# Patient Record
Sex: FEMALE | Race: WHITE | Employment: OTHER | URBAN - METROPOLITAN AREA
[De-identification: names, ages, dates, MRNs, and addresses within clinical notes are randomized per-mention and may not be internally consistent; named-entity substitution may affect disease eponyms.]

---

## 2017-08-22 ENCOUNTER — HOSPITAL ENCOUNTER (OUTPATIENT)
Dept: MAMMOGRAPHY | Age: 67
Discharge: HOME | End: 2017-08-22
Admitting: FAMILY MEDICINE

## 2017-08-22 DIAGNOSIS — Z12.31 VISIT FOR SCREENING MAMMOGRAM: ICD-10-CM

## 2017-09-07 ENCOUNTER — OFFICE VISIT (OUTPATIENT)
Dept: ORTHOPEDIC SURGERY | Age: 67
End: 2017-09-07

## 2017-09-07 VITALS
HEIGHT: 64 IN | DIASTOLIC BLOOD PRESSURE: 80 MMHG | BODY MASS INDEX: 42 KG/M2 | HEART RATE: 74 BPM | WEIGHT: 246 LBS | SYSTOLIC BLOOD PRESSURE: 136 MMHG

## 2017-09-07 DIAGNOSIS — M72.2 PLANTAR FASCIITIS, LEFT: ICD-10-CM

## 2017-09-07 DIAGNOSIS — M65.9 TENOSYNOVITIS OF FOOT: Primary | ICD-10-CM

## 2017-09-07 PROCEDURE — 99203 OFFICE O/P NEW LOW 30 MIN: CPT | Performed by: PODIATRIST

## 2017-09-07 RX ORDER — DILTIAZEM HYDROCHLORIDE 180 MG/1
CAPSULE, COATED, EXTENDED RELEASE ORAL
COMMUNITY
Start: 2017-01-10 | End: 2021-02-17

## 2017-09-07 RX ORDER — LOSARTAN POTASSIUM 100 MG/1
TABLET ORAL
COMMUNITY
Start: 2017-08-16

## 2017-09-07 RX ORDER — PREDNISONE 10 MG/1
TABLET ORAL
Qty: 26 TABLET | Refills: 0 | Status: SHIPPED | OUTPATIENT
Start: 2017-09-07 | End: 2021-02-17

## 2017-10-05 ENCOUNTER — OFFICE VISIT (OUTPATIENT)
Dept: ORTHOPEDIC SURGERY | Age: 67
End: 2017-10-05

## 2017-10-05 VITALS
WEIGHT: 246.03 LBS | BODY MASS INDEX: 42 KG/M2 | HEIGHT: 64 IN | HEART RATE: 74 BPM | SYSTOLIC BLOOD PRESSURE: 135 MMHG | DIASTOLIC BLOOD PRESSURE: 75 MMHG

## 2017-10-05 DIAGNOSIS — M72.2 PLANTAR FASCIITIS, LEFT: Primary | ICD-10-CM

## 2017-10-05 PROCEDURE — 99213 OFFICE O/P EST LOW 20 MIN: CPT | Performed by: PODIATRIST

## 2017-10-05 RX ORDER — SPIRONOLACTONE 50 MG/1
TABLET, FILM COATED ORAL
COMMUNITY
Start: 2017-09-18

## 2017-10-05 RX ORDER — GLIPIZIDE 10 MG/1
TABLET, FILM COATED, EXTENDED RELEASE ORAL
COMMUNITY
Start: 2017-08-14

## 2017-10-05 RX ORDER — LOSARTAN POTASSIUM 100 MG/1
TABLET ORAL
COMMUNITY
Start: 2017-08-16 | End: 2021-02-17

## 2017-10-05 RX ORDER — IBUPROFEN 800 MG/1
TABLET ORAL
COMMUNITY
Start: 2017-05-31 | End: 2021-05-12

## 2017-10-05 RX ORDER — DEXAMETHASONE SODIUM PHOSPHATE 4 MG/ML
INJECTION, SOLUTION INTRA-ARTICULAR; INTRALESIONAL; INTRAMUSCULAR; INTRAVENOUS; SOFT TISSUE
Qty: 30 ML | Refills: 0 | Status: SHIPPED | OUTPATIENT
Start: 2017-10-05 | End: 2021-02-17

## 2017-10-05 RX ORDER — DILTIAZEM HYDROCHLORIDE 180 MG/1
CAPSULE, COATED, EXTENDED RELEASE ORAL
COMMUNITY
Start: 2017-01-10 | End: 2021-02-17

## 2017-10-05 RX ORDER — FLUTICASONE PROPIONATE 50 MCG
1 SPRAY, SUSPENSION (ML) NASAL
COMMUNITY
Start: 2017-09-25

## 2017-10-05 NOTE — PROGRESS NOTES
HISTORY OF PRESENT ILLNESS: This is return visit for left plantar heel pain. There has been less than 50% improvement. She admits that her activity level has not changed all too much. PHYSICAL EXAMINATION: The majority of the palpable tenderness is at the plantar medial aspect of left heel. There is no pain with side-to-side compression of the heel, bilateral.     There are no paresthesias elicited at the tibial nerve, over the tarsal tunnel, bilateral. Sensation is otherwise grossly intact, bilateral.     There is no erythema, ecchymosis, or edema noted, bilateral.     Palpable pedal pulses are present, bilateral.     The remainder of the exam is unremarkable. ASSESSMENT: Plantar fasciitis , left       PLAN: I believe her activity level remains too high still. We discussed several activity modifications. She will continue with the boot. I prescribed physical therapy. A new temporary arch support was applied to her left foot. I will see her back in 4 weeks.

## 2017-10-05 NOTE — MR AVS SNAPSHOT
After Visit Summary             Kateryna Smith [address] [facility]   10/5/2017 9:10 AM   Office Visit    Description:  Female : 1950   Provider:  Rashid Alegre DPM   Department:  [facility]              Your Follow-Up and Future Appointments         Below is a list of your follow-up and future appointments. This may not be a complete list as you may have made appointments directly with providers that we are not aware of or your providers may have made some for you. Please call your providers to confirm appointments. It is important to keep your appointments. Please bring your current insurance card, photo ID, co-pay, and all medication bottles to your appointment. If self-pay, payment is expected at the time of service. Information from Your Visit        Department     Name Address Phone Fax    [facility] [address]  [address] [address] [place] [address] [facility] [phone]      You Were Seen for:         Comments    Plantar fasciitis, left   [[id number]]         Vital Signs     Blood Pressure Pulse Height Weight Body Mass Index Smoking Status    135/75 74 5' 4.17\" (1.63 m) 246 lb 0.5 oz (111.6 kg) 42 kg/m2 Never Smoker      Additional Information about your Body Mass Index (BMI)           Your BMI as listed above is considered obese (30 or more). BMI is an estimate of body fat, calculated from your height and weight. The higher your BMI, the greater your risk of heart disease, high blood pressure, type 2 diabetes, stroke, gallstones, arthritis, sleep apnea, and certain cancers. BMI is not perfect. It may overestimate body fat in athletes and people who are more muscular. Even a small weight loss (between 5 and 10 percent of your current weight) by decreasing your calorie intake and becoming more physically active will help lower your risk of developing or worsening diseases associated with obesity. Learn more at: [email or web address].[email or web address].uk             Today's Medication Changes          These changes are accurate as of: 10/5/17  9:55 AM.  If you have any questions, ask your nurse or doctor.                START taking these medications           dexamethasone 4 MG/ML injection   Commonly known as:  DECADRON   Instructions:  Apply via iontophoresis with physical therapy   Quantity:  30 mL   Refills:  0   Started by:  Riley Sanchez DPM            Where to Get Your Medications      These medications were sent to [address] [place] [address] Son, [address] [address] - P [phone] - F [phone]  [address], [address] [id number]     Phone:  [phone]     dexamethasone 4 MG/ML injection               Your Current Medications Are              fluticasone (FLONASE) 50 MCG/ACT nasal spray 1 spray by Nasal route    fluticasone-salmeterol (ADVAIR DISKUS) 500-50 MCG/DOSE diskus inhaler USE 1 INHALATION TWICE A DAY    spironolactone (ALDACTONE) 50 MG tablet TAKE 1 TABLET TWICE A DAY    diltiazem (CARTIA XT) 180 MG extended release capsule TAKE 1 CAPSULE DAILY    estrogens, conjugated, (PREMARIN) 0.3 MG tablet TAKE 2 TABLETS DAILY FOR 21 DAYS THEN DO NOT TAKE FOR 7 DAYS    glipiZIDE (GLUCOTROL XL) 10 MG extended release tablet TAKE 1 TABLET DAILY    metFORMIN (GLUCOPHAGE) 1000 MG tablet TAKE 1 TABLET TWICE A DAY WITH MEALS    losartan (COZAAR) 100 MG tablet TAKE 1 TABLET DAILY    ibuprofen (ADVIL;MOTRIN) 800 MG tablet TAKE 1 TABLET BY MOUTH EVERY EIGHT HOURS AS NEEDED    dexamethasone (DECADRON) 4 MG/ML injection Apply via iontophoresis with physical therapy    aclidinium (TUDORZA PRESSAIR) 400 MCG/ACT AEPB inhaler Inhale 1 puff into the lungs    fluticasone-salmeterol (ADVAIR DISKUS) 500-50 MCG/DOSE diskus inhaler USE 1 INHALATION TWICE A DAY    diltiazem (CARTIA XT) 180 MG extended release capsule TAKE 1 CAPSULE DAILY    losartan (COZAAR) 100 MG tablet TAKE 1 TABLET DAILY predniSONE (DELTASONE) 10 MG tablet 3 po bid x 2 days, then 2 po bid x 2 days, then 1 po bid x 2 days, then 1 po qd x 2 days      Allergies           No Known Allergies      We Ordered/Performed the following           Amb External Referral To Physical Therapy     Scheduling Instructions:    Physical therapy referral for plantar fasciitis. Please evaluate and treat. Modalities of choice to include ultrasound and iontophoresis with dexamethasone phosphate. Active and passive range of motion exercises, massage therapy. 2 times a week for 4 weeks. Comments: The patient can be scheduled with any member of the group, including the provider with the first available appointments. Additional Information        Basic Information     Date Of Birth Sex Race Ethnicity Preferred Language    1950 Female White Non-/Non  English      Problem List as of 10/5/2017  Date Reviewed: 10/5/2017                Plantar fasciitis, left    Tenosynovitis of foot      Preventive Care        Date Due    Hepatitis C screening is recommended for all adults regardless of risk factors born between [facility] at least once (lifetime) who have never been tested. 1950    Tetanus Combination Vaccine (1 - Tdap) 2/14/1968    Cholesterol Screening 2/14/1989    Diabetes Screening 2/14/1989    Colonoscopy 2/14/1999    Zoster Vaccine 2/14/2009    Pneumococcal Vaccines (two) for all adults aged 72 and over (1 of 2 - PCV13) 2/14/2014    Yearly Flu Vaccine (1) 9/1/2017    Mammograms are recommended every 2 years for low/average risk patients aged 48 - 69, and every year for high risk patients per updated national guidelines. However these guidelines can be individualized by your provider. 8/22/2019            WePopp Signup           WePopp allows you to send messages to your doctor, view your test results, renew your prescriptions, schedule appointments, view visit notes, and more. How Do I Sign Up? 1. In your Internet browser, go to [email or web address]  2. Click on the Sign Up Now link in the Sign In box. You will see the New Member Sign Up page. 3. Enter your [employer] Access Code exactly as it appears below. You will not need to use this code after youve completed the sign-up process. If you do not sign up before the expiration date, you must request a new code. 72[employer] Access Code: [email or web address]  Expires: 11/6/2017  4:10 PM    4. Enter your Social Security Number ([id number]) and Date of Birth (mm/dd/yyyy) as indicated and click Submit. You will be taken to the next sign-up page. 5. Create a iHydroRunt ID. This will be your [employer]uan login ID and cannot be changed, so think of one that is secure and easy to remember. 6. Create a 72xuan password. You can change your password at any time. 7. Enter your Password Reset Question and Answer. This can be used at a later time if you forget your password. 8. Enter your e-mail address. You will receive e-mail notification when new information is available in [address] 9. Click Sign Up. You can now view your medical record. Additional Information  If you have questions, please contact the physician practice where you receive care. Remember, 72[employer]uan is NOT to be used for urgent needs. For medical emergencies, dial 911. For questions regarding your [employer] account call [phone]. If you have a clinical question, please call your doctor's office.

## 2017-10-16 ENCOUNTER — HOSPITAL ENCOUNTER (OUTPATIENT)
Dept: PHYSICAL THERAPY | Age: 67
Discharge: HOME | End: 2017-10-31
Admitting: PODIATRIST

## 2017-10-16 NOTE — PLAN OF CARE
[facility]  Orthopaedics and Sports Rehabilitation, [facility]  [address], [place], [address]  Phone: [phone]   Fax:     [phone]                                                       Physical Therapy Certification    Dear Referring Practitioner: Dr. Rodolfo Coello,    We had the pleasure of evaluating the following patient for physical therapy services at [address] [place] [address]. A summary of our findings can be found in the initial assessment below. This includes our plan of care. If you have any questions or concerns regarding these findings, please do not hesitate to contact me at the office phone number checked above. Thank you for the referral.       Physician Signature:_______________________________Date:__________________  By signing above (or electronic signature), therapists plan is approved by physician      Patient: Sushil Suarez   : 1950   MRN: [id number]  Referring Physician: Referring Practitioner: Dr. Rodolfo Coello      Evaluation Date: 10/16/2017      Medical Diagnosis Information:  Diagnosis: Left plantar fascitis  M72.2   Treatment Diagnosis: PT practice pattern:  4D,    left foot pain                                         Insurance information: PT Insurance Information: [employer]   visits based on medical necessity   $0 copay     Precautions/ Contra-indications: none  Latex Allergy:  [x]NO      []YES  Preferred Language for Healthcare:   [x]English       []other:    SUBJECTIVE: Patient stated complaint: Left foot pain that is ongoing for the past 6 months. Twisted foot when walking down the stairs. Had MRI in May and was told she had 2 torn tendons in the foot. Has worn walking boot off/on since the injury. Has also had injection in the foot and taken a steroid dose pack with minimal relief. Currently wearing walking boot when up on feet.   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):   []None           Arthritic conditions   []Rheumatoid arthritis (M05.9)  [x]Osteoarthritis (M19.91)   Cardiovascular conditions   [x]Hypertension (I10)  []Hyperlipidemia (E78.5)  []Angina pectoris (I20)  []Atherosclerosis (I70)   Musculoskeletal conditions   []Disc pathology   []Congenital spine pathologies   []Prior surgical intervention  []Osteoporosis (M81.8)  []Osteopenia (M85.8)   Endocrine conditions   []Hypothyroid (E03.9)  []Hyperthyroid Gastrointestinal conditions   []Constipation (Q23.64)   Metabolic conditions   []Morbid obesity (E66.01)  []Diabetes type 1(E10.65) or 2 (E11.65)   []Neuropathy (G60.9)     Pulmonary conditions   [x]Asthma (J45)  []Coughing   []COPD (J44.9)   Psychological Disorders  []Anxiety (F41.9)  []Depression (F32.9)   []Other:   []Other:          Barriers to/and or personal factors that will affect rehab potential:              []Age  []Sex              []Motivation/Lack of Motivation                        []Co-Morbidities              []Cognitive Function, education/learning barriers              []Environmental, home barriers              []profession/work barriers  []past PT/medical experience  []other:  Justification:     PACEMAKER:  - Denied having a pacemaker that would contraindicate the use of electrical modalities. METAL IMPLANTS:  - Denied metal implants that would contraindicate the use of thermal modalities. CANCER HISTORY:  - Denied a history of cancer that would contraindicate thermal modalities. Falls Risk Assessment (30 days):   [x] Falls Risk assessed and no intervention required. [] Falls Risk assessed and Patient requires intervention due to being higher risk   TUG score (>12s at risk):     [] Falls education provided, including       G-Codes:  PT G-Codes  Functional Assessment Tool Used: LEFS  Score: 46%  Functional Limitation: Mobility: Walking and moving around  Mobility: Walking and Moving Around Current Status ():  At weakness/NMR control      [x]Signs/symptoms consistent with tendinitis/tendinosis    []signs/symptoms consistent with pathology which may benefit from Dry needling      []other:      Prognosis/Rehab Potential:      []Excellent   [x]Good    []Fair   []Poor    Tolerance of evaluation/treatment:    []Excellent   [x]Good    []Fair   []Poor    Physical Therapy Evaluation Complexity Justification  [x] A history of present problem with:  [] no personal factors and/or comorbidities that impact the plan of care;  []1-2 personal factors and/or comorbidities that impact the plan of care  [x]3 personal factors and/or comorbidities that impact the plan of care  [x] An examination of body systems using standardized tests and measures addressing any of the following: body structures and functions (impairments), activity limitations, and/or participation restrictions;:  [] a total of 1-2 or more elements   [] a total of 3 or more elements   [x] a total of 4 or more elements   [x] A clinical presentation with:  [x] stable and/or uncomplicated characteristics   [] evolving clinical presentation with changing characteristics  [] unstable and unpredictable characteristics;   [x] Clinical decision making of [x] low, [] moderate, [] high complexity using standardized patient assessment instrument and/or measurable assessment of functional outcome. [x] EVAL (LOW) 66875 (typically 20 minutes face-to-face)  [] EVAL (MOD) 07584 (typically 30 minutes face-to-face)  [] EVAL (HIGH) 71656 (typically 45 minutes face-to-face)  [] RE-EVAL     PLAN  Frequency/Duration:  2 days per week for 4 Weeks:  Interventions:  1. Therapeutic exercise including: strength training, ROM/flexibility, NMR and proprioception for the proximal hip, core and Lower extremity  2. Manual therapy as indicated including Dry Needling/IASTM, STM, PROM, Gr I-IV mobilizations, spinal mobilization/manipulation.    3. Modalities as needed including: thermal agents, E-stim, US,

## 2017-10-16 NOTE — FLOWSHEET NOTE
[facility]  Orthopaedics and Sports Rehabilitation, [facility]    Physical Therapy Daily Treatment Note  Date:  10/16/2017    Patient Name:  Lynita Rinne    :  1950  MRN: [id number]  Restrictions/Precautions:    Medical/Treatment Diagnosis Information:  · Diagnosis: Left plantar fascitis  M72.2  · Treatment Diagnosis: PT practice pattern:  4D,    left foot pain  Insurance/Certification information:  PT Insurance Information: [facility] Medicare   visits based on medical necessity   $0 copay  Physician Information:  Referring Practitioner: Dr. Rebecca Aguilar of care signed (Y/N):     Date of Patient follow up with Physician:     G-Code (if applicable):   CK   Date G-Code Applied:  10/16/17  PT G-Codes  Functional Assessment Tool Used: LEFS  Score: 46%  Functional Limitation: Mobility: Walking and moving around  Mobility: Walking and Moving Around Current Status (): At least 40 percent but less than 60 percent impaired, limited or restricted  Mobility: Walking and Moving Around Goal Status (): At least 1 percent but less than 20 percent impaired, limited or restricted    Progress Note: [x]  Yes  []  No  Next due by: Visit #10       Latex Allergy:  [x]NO      []YES  Preferred Language for Healthcare:   [x]English       []other:    Visit # Insurance Allowable   1 BMN     Pain level:  5/10     SUBJECTIVE:  See eval    OBJECTIVE: See eval  Observation:   Test measurements:      RESTRICTIONS/PRECAUTIONS:  OA, HBP, asthma    Exercises/Interventions:     Exercise Sets/Reps Notes Last Progression   Gastroc,/Soleus Stretch 3x30'' each belt    Heelslides       LLLD Wallslide      HS Stretch:  Tableside  3x30''     SAQ      LAQ      SLR Flex      SLR Abd       SLR Ext      SLR Add.       Clamshells      Bridges      HR/TR      Ankle Theraband      BAPS      Bike      Elliptical      Standing Stretch:  (insert muscles)      Weight Shifting      Lateral Walk      Squats      Single Leg Stance Balance Seated arch stretch 3x30''     Towel crunch 3'                                 Therapeutic Exercise and NMR EXR  [] (07942) Provided verbal/tactile cueing for activities related to strengthening, flexibility, endurance, ROM for improvements in LE, proximal hip, and core control with self care, mobility, lifting, ambulation.  [] (78501) Provided verbal/tactile cueing for activities related to improving balance, coordination, kinesthetic sense, posture, motor skill, proprioception  to assist with LE, proximal hip, and core control in self care, mobility, lifting, ambulation and eccentric single leg control.      NMR and Therapeutic Activities:    [] (14917 or 96631) Provided verbal/tactile cueing for activities related to improving balance, coordination, kinesthetic sense, posture, motor skill, proprioception and motor activation to allow for proper function of core, proximal hip and LE with self care and ADLs  [] (57698) Gait Re-education- Provided training and instruction to the patient for proper LE, core and proximal hip recruitment and positioning and eccentric body weight control with ambulation re-education including up and down stairs     Home Exercise Program:    [x] (26603) Reviewed/Progressed HEP activities related to strengthening, flexibility, endurance, ROM of core, proximal hip and LE for functional self-care, mobility, lifting and ambulation/stair navigation   [] (77891)Reviewed/Progressed HEP activities related to improving balance, coordination, kinesthetic sense, posture, motor skill, proprioception of core, proximal hip and LE for self care, mobility, lifting, and ambulation/stair navigation      Manual Treatments:  PROM / Scar Mobs / STM/Rollerstick / Knee (Flex./Ext.)  Stretch: H.S. / ITB / Piriformis / Quad / Groin / Hip Flexor   [] (48804) Provided manual therapy to mobilize LE, proximal hip and/or LS spine soft tissue/joints for the purpose of modulating pain, promoting relaxation, increasing ROM, reducing/eliminating soft tissue swelling/inflammation/restriction, improving soft tissue extensibility and allowing for proper ROM for normal function with self care, mobility, lifting and ambulation. Modalities:  U/S 1.2 w/cm, 100%, # MHz x8'; Take home Ionto #1    Charges:  Timed Code Treatment Minutes: 25   Total Treatment Minutes: 40     [x] EVAL (LOW) 44597 (typically 20 minutes face-to-face)  [] EVAL (MOD) 54902 (typically 30 minutes face-to-face)  [] EVAL (HIGH) 06697 (typically 45 minutes face-to-face)  [] RE-EVAL      [x] DS(24547) x  1   [x] IONTO  [] NMR (09102) x      [] VASO  [] Manual (83846) x       [x] Other: U/S  [] TA x       [] Mech Traction (84490)  [] ES(attended) (05293)      [] ES (un) (91333):     GOALS:   Short Term Goals: To be achieved in: 2 weeks  1. Independent in HEP and progression per patient tolerance, in order to prevent re-injury. 2. Patient will have a decrease in pain to facilitate improvement in movement, function, and ADLs as indicated by Functional Deficits. Long Term Goals: To be achieved in: 4 weeks  1. Disability index score of 20% or less for the LEFS to assist with reaching prior level of function. 2. Patient will demonstrate increased AROM to ankle DF 12 to allow for proper joint functioning as indicated by patients Functional Deficits. 3. Patient will demonstrate an increase in Strength to 5/5 ankle PF, ever to allow for proper functional mobility as indicated by patients Functional Deficits. 4. Patient will return to walking for 15 mintues without increased symptoms or restriction. Progression Towards Functional goals:  [] Patient is progressing as expected towards functional goals listed. [] Progression is slowed due to complexities listed. [] Progression has been slowed due to co-morbidities.   [x] Plan just implemented, too soon to assess goals progression  [] Other:     ASSESSMENT:  See eval    Treatment/Activity Tolerance:  [x] Patient tolerated treatment well [] Patient limited by fatique  [] Patient limited by pain  [] Patient limited by other medical complications  [] Other:     Prognosis: [x] Good [] Fair  [] Poor    Patient Requires Follow-up: [x] Yes  [] No    PLAN FOR NEXT SESSION:     PLAN: See eval  [] Continue per plan of care [] Alter current plan (see comments)  [x] Plan of care initiated [] Hold pending MD visit [] Discharge    Electronically signed by: Fabiola Amador PT [id number]

## 2017-10-20 ENCOUNTER — HOSPITAL ENCOUNTER (OUTPATIENT)
Dept: PHYSICAL THERAPY | Age: 67
Discharge: HOME | End: 2017-10-21
Admitting: PODIATRIST

## 2017-10-23 ENCOUNTER — HOSPITAL ENCOUNTER (OUTPATIENT)
Dept: PHYSICAL THERAPY | Age: 67
Discharge: HOME | End: 2017-10-24
Admitting: PODIATRIST

## 2017-10-30 ENCOUNTER — HOSPITAL ENCOUNTER (OUTPATIENT)
Dept: PHYSICAL THERAPY | Age: 67
Discharge: HOME | End: 2017-10-31
Admitting: PODIATRIST

## 2017-11-01 ENCOUNTER — HOSPITAL ENCOUNTER (OUTPATIENT)
Dept: PHYSICAL THERAPY | Age: 67
Discharge: HOME | End: 2017-11-30
Attending: PODIATRIST | Admitting: PODIATRIST

## 2017-11-02 ENCOUNTER — HOSPITAL ENCOUNTER (OUTPATIENT)
Dept: PHYSICAL THERAPY | Age: 67
Discharge: HOME | End: 2017-11-03
Admitting: PODIATRIST

## 2017-11-07 ENCOUNTER — HOSPITAL ENCOUNTER (OUTPATIENT)
Dept: PHYSICAL THERAPY | Age: 67
Discharge: HOME | End: 2017-11-08
Admitting: PODIATRIST

## 2017-11-09 ENCOUNTER — OFFICE VISIT (OUTPATIENT)
Dept: ORTHOPEDIC SURGERY | Age: 67
End: 2017-11-09

## 2017-11-09 VITALS
WEIGHT: 246.03 LBS | HEART RATE: 78 BPM | DIASTOLIC BLOOD PRESSURE: 70 MMHG | BODY MASS INDEX: 42 KG/M2 | SYSTOLIC BLOOD PRESSURE: 133 MMHG | HEIGHT: 64 IN

## 2017-11-09 DIAGNOSIS — M72.2 PLANTAR FASCIITIS, LEFT: Primary | ICD-10-CM

## 2017-11-09 PROCEDURE — G8484 FLU IMMUNIZE NO ADMIN: HCPCS | Performed by: PODIATRIST

## 2017-11-09 PROCEDURE — 1036F TOBACCO NON-USER: CPT | Performed by: PODIATRIST

## 2017-11-09 PROCEDURE — 99213 OFFICE O/P EST LOW 20 MIN: CPT | Performed by: PODIATRIST

## 2017-11-09 PROCEDURE — 4040F PNEUMOC VAC/ADMIN/RCVD: CPT | Performed by: PODIATRIST

## 2017-11-09 PROCEDURE — 3014F SCREEN MAMMO DOC REV: CPT | Performed by: PODIATRIST

## 2017-11-09 PROCEDURE — G8417 CALC BMI ABV UP PARAM F/U: HCPCS | Performed by: PODIATRIST

## 2017-11-09 PROCEDURE — L3040 FT ARCH SUPRT PREMOLD LONGIT: HCPCS | Performed by: PODIATRIST

## 2017-11-09 PROCEDURE — 1123F ACP DISCUSS/DSCN MKR DOCD: CPT | Performed by: PODIATRIST

## 2017-11-09 PROCEDURE — G8399 PT W/DXA RESULTS DOCUMENT: HCPCS | Performed by: PODIATRIST

## 2017-11-09 PROCEDURE — 3017F COLORECTAL CA SCREEN DOC REV: CPT | Performed by: PODIATRIST

## 2017-11-09 PROCEDURE — G8427 DOCREV CUR MEDS BY ELIG CLIN: HCPCS | Performed by: PODIATRIST

## 2017-11-09 PROCEDURE — 1090F PRES/ABSN URINE INCON ASSESS: CPT | Performed by: PODIATRIST

## 2017-11-09 RX ORDER — DILTIAZEM HYDROCHLORIDE 180 MG/1
CAPSULE, COATED, EXTENDED RELEASE ORAL
COMMUNITY
Start: 2017-10-09 | End: 2021-02-17

## 2017-11-09 NOTE — PROGRESS NOTES
HISTORY OF PRESENT ILLNESS: This is return visit for left plantar heel pain. There has been 100% improvement with physical therapy. She states that she went shopping yesterday and for the 1st time in 7 months she did not have any pain afterwards. PHYSICAL EXAMINATION: She has no palpable tenderness of the left heel. There is no pain with side-to-side compression of the heel, bilateral.     There are no paresthesias elicited at the tibial nerve, over the tarsal tunnel, bilateral. Sensation is otherwise grossly intact, bilateral.     There is no erythema, ecchymosis, or edema noted, bilateral.     Palpable pedal pulses are present, bilateral.     The remainder of the exam is unremarkable. ASSESSMENT: Plantar fasciitis , left       PLAN: I advised her to continue the stretching exercises and to gradually increase activity. A set of PowerStep foot orthotics was dispensed. We discussed the appropriate use of them. I advised the patient to use them full time in a supportive shoe that will fit them. Procedures    Powerstep Protech Full Length Insert     Patient was prescribed Powerstep Protech Full Length Inserts. The bilateral foot will require stabilization / support from this semi-rigid / rigid orthosis to improve their function. The orthosis will assist in protecting the affected area, provide functional support and facilitate healing. The patient was educated and fit by a healthcare professional with expert knowledge and specialization in brace application while under the direct supervision of the treating physician. Verbal and written instructions for the use of and application of this item were provided. They were instructed to contact the office immediately should the brace result in increased pain, decreased sensation, increased swelling or worsening of the condition.

## 2017-12-01 ENCOUNTER — HOSPITAL ENCOUNTER (OUTPATIENT)
Dept: PHYSICAL THERAPY | Age: 67
Discharge: HOME | End: 2017-12-31
Attending: PODIATRIST | Admitting: PODIATRIST

## 2018-11-06 ENCOUNTER — HOSPITAL ENCOUNTER (OUTPATIENT)
Dept: MAMMOGRAPHY | Age: 68
Discharge: HOME | End: 2018-11-06
Payer: MEDICARE

## 2018-11-06 DIAGNOSIS — Z12.31 VISIT FOR SCREENING MAMMOGRAM: ICD-10-CM

## 2018-11-06 PROCEDURE — 77067 SCR MAMMO BI INCL CAD: CPT

## 2018-11-12 ENCOUNTER — HOSPITAL ENCOUNTER (OUTPATIENT)
Dept: MAMMOGRAPHY | Age: 68
Discharge: HOME | End: 2018-11-12
Payer: MEDICARE

## 2018-11-12 ENCOUNTER — HOSPITAL ENCOUNTER (OUTPATIENT)
Dept: ULTRASOUND IMAGING | Age: 68
Discharge: HOME | End: 2018-11-12
Payer: MEDICARE

## 2018-11-12 DIAGNOSIS — R92.8 ABNORMAL MAMMOGRAM: ICD-10-CM

## 2018-11-12 PROCEDURE — 77065 DX MAMMO INCL CAD UNI: CPT

## 2018-11-12 PROCEDURE — 76642 ULTRASOUND BREAST LIMITED: CPT

## 2021-02-04 NOTE — PROGRESS NOTES
[facility] / [facility] ([facility]) [address] [facility], [address]    Acknowledgment of Informed Consent for Surgical or Medical Procedure and Sedation  I agree to allow doctor(s) Meghan Booth and his/her associates or assistants, including residents and/or other qualified medical practitioner to perform the following medical treatment or procedure and to administer or direct the administration of sedation as necessary:  Procedure(s): RIGHT MINIMALLY [address]   My doctor has explained the following regarding the proposed procedure:   the explanation of the procedure   the benefits of the procedure   the potential problems that might occur during recuperation   the risks and side effects of the procedure which could include but are not limited to severe blood loss, infection, stroke or death   the benefits, risks and side effect of alternative procedures including the consequences of declining this procedure or any alternative procedures   the likelihood of achieving satisfactory results. I acknowledge no guarantee or assurance has been made to me regarding the results. I understand that during the course of this treatment/procedure, unforeseen conditions can occur which require an additional or different procedure. I agree to allow my physician or assistants to perform such extension of the original procedure as they may find necessary. I understand that sedation will often result in temporary impairment of memory and fine motor skills and that sedation can occasionally progress to a state of deep sedation or general anesthesia. I understand the risks of anesthesia for surgery include, but are not limited to, sore throat, hoarseness, injury to face, mouth, or teeth; nausea; headache; injury to blood vessels or nerves; death, brain damage, or paralysis.     I understand that if I have a Limitation of Treatment order in effect during my hospitalization, the order may or may not be in effect during this procedure. I give my doctor permission to give me blood or blood products. I understand that there are risks with receiving blood such as hepatitis, AIDS, fever, or allergic reaction. I acknowledge that the risks, benefits, and alternatives of this treatment have been explained to me and that no express or implied warranty has been given by the hospital, any blood bank, or any person or entity as to the blood or blood components transfused. At the discretion of my doctor, I agree to allow observers, equipment/product representatives and allow photographing, and/or televising of the procedure, provided my name or identity is maintained confidentially. I agree the hospital may dispose of or use for scientific or educational purposes any tissue, fluid, or body parts which may be removed.     ________________________________Date________Time______ am/pm  ([facility])  Patient or Signature of Closest Relative or Legal Guardian    ________________________________Date________Time______am/pm      Page 1 of  1  Witness

## 2021-02-17 ENCOUNTER — NURSE ONLY (OUTPATIENT)
Dept: PRIMARY CARE CLINIC | Age: 71
End: 2021-02-17
Payer: COMMERCIAL

## 2021-02-17 DIAGNOSIS — Z01.818 PREOP EXAMINATION: Primary | ICD-10-CM

## 2021-02-17 PROCEDURE — 99211 OFF/OP EST MAY X REQ PHY/QHP: CPT | Performed by: NURSE PRACTITIONER

## 2021-02-17 RX ORDER — BUDESONIDE AND FORMOTEROL FUMARATE DIHYDRATE 160; 4.5 UG/1; UG/1
AEROSOL RESPIRATORY (INHALATION)
COMMUNITY
Start: 2021-02-03

## 2021-02-17 RX ORDER — TIOTROPIUM BROMIDE INHALATION SPRAY 1.56 UG/1
2 SPRAY, METERED RESPIRATORY (INHALATION) DAILY
COMMUNITY
Start: 2021-02-05

## 2021-02-17 RX ORDER — MIRABEGRON 50 MG/1
TABLET, FILM COATED, EXTENDED RELEASE ORAL
COMMUNITY
Start: 2021-01-29

## 2021-02-17 RX ORDER — ALBUTEROL SULFATE 90 UG/1
AEROSOL, METERED RESPIRATORY (INHALATION)
COMMUNITY
Start: 2020-07-27

## 2021-02-17 RX ORDER — ACETAMINOPHEN 500 MG
1000 TABLET ORAL 2 TIMES DAILY
COMMUNITY

## 2021-02-17 RX ORDER — ASPIRIN 81 MG/1
81 TABLET, CHEWABLE ORAL DAILY
COMMUNITY

## 2021-02-17 RX ORDER — DILTIAZEM HYDROCHLORIDE 180 MG/1
CAPSULE, EXTENDED RELEASE ORAL
COMMUNITY
Start: 2020-07-20

## 2021-02-17 RX ORDER — AMLODIPINE BESYLATE 5 MG/1
TABLET ORAL NIGHTLY
COMMUNITY
Start: 2021-01-06

## 2021-02-17 RX ORDER — ERGOCALCIFEROL (VITAMIN D2) 1250 MCG
CAPSULE ORAL
COMMUNITY
Start: 2020-03-02

## 2021-02-17 NOTE — PROGRESS NOTES
[address] [facility] patients having surgery or anesthesia are required to be Covid tested. You will need to quarantine from the time you are tested until your surgery. PRIOR TO PROCEDURE DATE:        1. PLEASE FOLLOW ANY  GUIDELINES/ INSTRUCTIONS PRIOR TO YOUR PROCEDURE AS ADVISED BY YOUR SURGEON. 2. Arrange for someone to drive you home and be with you for the first 24 hours after discharge for your safety after your procedure for which you received sedation. Ensure it is someone we can share information with regarding your discharge. 3. You must contact your surgeon for instructions IF:   You are taking any blood thinners, aspirin, anti-inflammatory or vitamin E.   There is a change in your physical condition such as a cold, fever, rash, cuts, sores or any other infection, especially near your surgical site. 4. Do not drink alcohol the day before or day of your procedure. 5. A Pre-op History and Physical for surgery MUST be completed by your Physician or Urgent Care within 30 days of your procedure date. Please bring a copy with you on the day of your procedure and along with any other testing performed. THE DAY OF YOUR PROCEDURE:  1. Follow instructions for ARRIVAL TIME as DIRECTED BY YOUR SURGEON. I    1. Enter the MAIN entrance from [address] and follow the signs to the free [employer] or [employer] parking (offered free of charge 6am-5pm). 2. Enter the Main Entrance of the hospital (do not enter from the lower level of the parking garage). Upon entrance, check in with the  at the main desk on your left. If no one is available at the desk, proceed into the [facility] Waiting Room and go through the door directly into the [facility]. There is a Check-in desk ACROSS from Room 5 (marked with a sign hanging from the ceiling).  The phone number for the surgery center is co-pay, you may want to bring a method of payment, i.e. Check or credit card, if you wish to pay your co-pay the day of surgery. 12. If you are to stay overnight, you may bring a bag with personal items. Please have any large items you may need brought in by your family after your arrival to your hospital room. 15. If you have a Living Will or Durable Power of , please bring a copy on the day of your procedure. 15. With your permission, one family member may accompany you while you are being prepared for surgery. Once you are ready, additional family members may join you. HOW WE KEEP YOU SAFE and WORK TO PREVENT SURGICAL SITE INFECTIONS:  1. Health care workers should always check your ID bracelet to verify your name and birth date. You will be asked many times to state your name, date of birth, and allergies. 2. Health care workers should always clean their hands with soap or alcohol gel before providing care to you. It is okay to ask anyone if they cleaned their hands before they touch you. 3. You will be actively involved in verifying the type of procedure you are having and ensuring the correct surgical site. This will be confirmed multiple times prior to your procedure. Do NOT stiven your surgery site UNLESS instructed to by your surgeon. 4. Do not shave or wax for 72 hours prior to procedure near your operative site. Shaving with a razor can irritate your skin and make it easier to develop an infection. On the day of your procedure, any hair that needs to be removed near the surgical site will be clipped by a healthcare worker using a special clippers designed to avoid skin irritation. 5. When you are in the operating room, your surgical site will be cleansed with a special soap, and in most cases, you will be given an antibiotic before the surgery begins. What to expect AFTER YOUR PROCEDURE:  1.  Immediately following your procedure, your will be taken to the PACU for the first phase of your recovery. Your nurse will help you recover from any potential side effects of anesthesia, such as extreme drowsiness, changes in your vital signs or breathing patterns. Nausea, headache, muscle aches, or sore throat may also occur after anesthesia. Your nurse will help you manage these potential side effects. 2. For comfort and safety, arrange to have someone at home with you for the first 24 hours after discharge. 3. You and your family will be given written instructions about your diet, activity, dressing care, medications, and return visits. 4. Once at home, should issues with nausea, pain, or bleeding occur, or should you notice any signs of infection, you should call your surgeon. 5. Always clean your hands before and after caring for your wound. Do not let your family touch your surgery site without cleaning their hands. 6. Narcotic pain medications can cause significant constipation. You may want to add a stool softener to your postoperative medication schedule or speak to your surgeon on how best to manage this SIDE EFFECT. SPECIAL INSTRUCTIONS     Thank you for allowing us to care for you. We strive to exceed your expectations in the delivery of care and service provided to you and your family. If you need to contact the [place] [address] staff for any reason, please call us at [phone]    Instructions reviewed with patient during preadmission testing phone interview. Gerald Lyn. 2/17/2021 .12:07 PM      ADDITIONAL EDUCATIONAL INFORMATION REVIEWED PER PHONE WITH YOU AND/OR YOUR FAMILY:    Yes Hibiclens® Bathing Instructions

## 2021-02-17 NOTE — PROGRESS NOTES
Pt being discharged home to  DOS. Pt has watched Total Joint video, link given through surgeon's office.

## 2021-02-18 LAB — SARS-COV-2, NAA: NOT DETECTED

## 2021-02-20 ENCOUNTER — ANESTHESIA EVENT (OUTPATIENT)
Dept: OPERATING ROOM | Age: 71
End: 2021-02-20
Payer: MEDICARE

## 2021-02-23 ENCOUNTER — HOSPITAL ENCOUNTER (OUTPATIENT)
Age: 71
Setting detail: OBSERVATION
Discharge: HOME | End: 2021-02-23
Attending: ORTHOPAEDIC SURGERY | Admitting: ORTHOPAEDIC SURGERY
Payer: MEDICARE

## 2021-02-23 ENCOUNTER — APPOINTMENT (OUTPATIENT)
Dept: GENERAL RADIOLOGY | Age: 71
End: 2021-02-23
Attending: ORTHOPAEDIC SURGERY
Payer: MEDICARE

## 2021-02-23 ENCOUNTER — ANESTHESIA (OUTPATIENT)
Dept: OPERATING ROOM | Age: 71
End: 2021-02-23
Payer: MEDICARE

## 2021-02-23 VITALS
DIASTOLIC BLOOD PRESSURE: 78 MMHG | OXYGEN SATURATION: 94 % | TEMPERATURE: 97.2 F | RESPIRATION RATE: 11 BRPM | SYSTOLIC BLOOD PRESSURE: 123 MMHG

## 2021-02-23 VITALS
DIASTOLIC BLOOD PRESSURE: 72 MMHG | BODY MASS INDEX: 41.59 KG/M2 | HEIGHT: 67 IN | OXYGEN SATURATION: 92 % | HEART RATE: 75 BPM | RESPIRATION RATE: 18 BRPM | SYSTOLIC BLOOD PRESSURE: 138 MMHG | WEIGHT: 265 LBS | TEMPERATURE: 96.3 F

## 2021-02-23 DIAGNOSIS — M17.11 ARTHRITIS OF RIGHT KNEE: Primary | ICD-10-CM

## 2021-02-23 LAB
ABO/RH: NORMAL
ANTIBODY SCREEN: NORMAL
C-REACTIVE PROTEIN: 3.3 MG/L (ref 0–5.1)
GLUCOSE BLD-MCNC: 124 MG/DL (ref 70–99)
GLUCOSE BLD-MCNC: 159 MG/DL (ref 70–99)
PERFORMED ON: ABNORMAL
PERFORMED ON: ABNORMAL
SEDIMENTATION RATE, ERYTHROCYTE: 11 MM/HR (ref 0–30)

## 2021-02-23 PROCEDURE — 6360000002 HC RX W HCPCS: Performed by: ANESTHESIOLOGY

## 2021-02-23 PROCEDURE — C1713 ANCHOR/SCREW BN/BN,TIS/BN: HCPCS | Performed by: ORTHOPAEDIC SURGERY

## 2021-02-23 PROCEDURE — 97110 THERAPEUTIC EXERCISES: CPT

## 2021-02-23 PROCEDURE — 97116 GAIT TRAINING THERAPY: CPT

## 2021-02-23 PROCEDURE — 6360000002 HC RX W HCPCS: Performed by: ORTHOPAEDIC SURGERY

## 2021-02-23 PROCEDURE — 6370000000 HC RX 637 (ALT 250 FOR IP): Performed by: ORTHOPAEDIC SURGERY

## 2021-02-23 PROCEDURE — 3600000005 HC SURGERY LEVEL 5 BASE: Performed by: ORTHOPAEDIC SURGERY

## 2021-02-23 PROCEDURE — 2580000003 HC RX 258: Performed by: ORTHOPAEDIC SURGERY

## 2021-02-23 PROCEDURE — 7100000000 HC PACU RECOVERY - FIRST 15 MIN: Performed by: ORTHOPAEDIC SURGERY

## 2021-02-23 PROCEDURE — 97530 THERAPEUTIC ACTIVITIES: CPT

## 2021-02-23 PROCEDURE — 6360000002 HC RX W HCPCS

## 2021-02-23 PROCEDURE — 97161 PT EVAL LOW COMPLEX 20 MIN: CPT

## 2021-02-23 PROCEDURE — 97165 OT EVAL LOW COMPLEX 30 MIN: CPT

## 2021-02-23 PROCEDURE — 2500000003 HC RX 250 WO HCPCS: Performed by: ORTHOPAEDIC SURGERY

## 2021-02-23 PROCEDURE — 2500000003 HC RX 250 WO HCPCS: Performed by: NURSE ANESTHETIST, CERTIFIED REGISTERED

## 2021-02-23 PROCEDURE — 7100000010 HC PHASE II RECOVERY - FIRST 15 MIN: Performed by: ORTHOPAEDIC SURGERY

## 2021-02-23 PROCEDURE — 7100000011 HC PHASE II RECOVERY - ADDTL 15 MIN: Performed by: ORTHOPAEDIC SURGERY

## 2021-02-23 PROCEDURE — 3600000015 HC SURGERY LEVEL 5 ADDTL 15MIN: Performed by: ORTHOPAEDIC SURGERY

## 2021-02-23 PROCEDURE — 6360000002 HC RX W HCPCS: Performed by: NURSE ANESTHETIST, CERTIFIED REGISTERED

## 2021-02-23 PROCEDURE — 3700000000 HC ANESTHESIA ATTENDED CARE: Performed by: ORTHOPAEDIC SURGERY

## 2021-02-23 PROCEDURE — 85652 RBC SED RATE AUTOMATED: CPT

## 2021-02-23 PROCEDURE — 6370000000 HC RX 637 (ALT 250 FOR IP): Performed by: ANESTHESIOLOGY

## 2021-02-23 PROCEDURE — 7100000001 HC PACU RECOVERY - ADDTL 15 MIN: Performed by: ORTHOPAEDIC SURGERY

## 2021-02-23 PROCEDURE — 86140 C-REACTIVE PROTEIN: CPT

## 2021-02-23 PROCEDURE — 87081 CULTURE SCREEN ONLY: CPT

## 2021-02-23 PROCEDURE — 86900 BLOOD TYPING SEROLOGIC ABO: CPT

## 2021-02-23 PROCEDURE — 62327 NJX INTERLAMINAR LMBR/SAC: CPT | Performed by: ANESTHESIOLOGY

## 2021-02-23 PROCEDURE — 86901 BLOOD TYPING SEROLOGIC RH(D): CPT

## 2021-02-23 PROCEDURE — 86850 RBC ANTIBODY SCREEN: CPT

## 2021-02-23 PROCEDURE — C1776 JOINT DEVICE (IMPLANTABLE): HCPCS | Performed by: ORTHOPAEDIC SURGERY

## 2021-02-23 PROCEDURE — 2720000010 HC SURG SUPPLY STERILE: Performed by: ORTHOPAEDIC SURGERY

## 2021-02-23 PROCEDURE — 73560 X-RAY EXAM OF KNEE 1 OR 2: CPT

## 2021-02-23 PROCEDURE — 2709999900 HC NON-CHARGEABLE SUPPLY: Performed by: ORTHOPAEDIC SURGERY

## 2021-02-23 PROCEDURE — 97535 SELF CARE MNGMENT TRAINING: CPT

## 2021-02-23 PROCEDURE — 3700000001 HC ADD 15 MINUTES (ANESTHESIA): Performed by: ORTHOPAEDIC SURGERY

## 2021-02-23 DEVICE — KNEE K1 TOT HEMI STD CEM IMPL CAPPED K1 SN: Type: IMPLANTABLE DEVICE | Site: KNEE | Status: FUNCTIONAL

## 2021-02-23 DEVICE — NEXGEN PRECOAT STEMMED TIBIAL PLATE SZ 4: Type: IMPLANTABLE DEVICE | Site: KNEE | Status: FUNCTIONAL

## 2021-02-23 DEVICE — CEMENT BNE 20ML 41GM FULL DOSE PMMA W/ TOBRA M VISC RADPQ: Type: IMPLANTABLE DEVICE | Site: KNEE | Status: FUNCTIONAL

## 2021-02-23 DEVICE — NEXGEN ALL-POLY PATELLA 32MM: Type: IMPLANTABLE DEVICE | Site: KNEE | Status: FUNCTIONAL

## 2021-02-23 DEVICE — COMPONENT FEM SZ -E R KNEE ZMLY PMMA PC PRI PRESSFIT CRUCE: Type: IMPLANTABLE DEVICE | Site: KNEE | Status: FUNCTIONAL

## 2021-02-23 DEVICE — SCREW BNE L48MM CONSTRN CNDYL KNEE HEX HD STEM FOR LEG NXGN: Type: IMPLANTABLE DEVICE | Site: KNEE | Status: FUNCTIONAL

## 2021-02-23 DEVICE — NEXGEN STRAIGHT STEM EXT 15MM DIA X 75MM(30MM): Type: IMPLANTABLE DEVICE | Site: KNEE | Status: FUNCTIONAL

## 2021-02-23 DEVICE — Z DUP USE 2208594 NEXGEN CR FLEX PROLONG ART SURF C-H/34 YEL 12MM: Type: IMPLANTABLE DEVICE | Site: KNEE | Status: FUNCTIONAL

## 2021-02-23 RX ORDER — OXYCODONE HYDROCHLORIDE 5 MG/1
10 TABLET ORAL EVERY 4 HOURS PRN
Status: CANCELLED | OUTPATIENT
Start: 2021-02-23

## 2021-02-23 RX ORDER — SODIUM CHLORIDE, SODIUM LACTATE, POTASSIUM CHLORIDE, CALCIUM CHLORIDE 600; 310; 30; 20 MG/100ML; MG/100ML; MG/100ML; MG/100ML
INJECTION, SOLUTION INTRAVENOUS CONTINUOUS
Status: DISCONTINUED | OUTPATIENT
Start: 2021-02-23 | End: 2021-02-23 | Stop reason: HOSPADM

## 2021-02-23 RX ORDER — ALBUTEROL SULFATE 2.5 MG/3ML
2.5 SOLUTION RESPIRATORY (INHALATION) EVERY 4 HOURS PRN
Status: CANCELLED | OUTPATIENT
Start: 2021-02-23

## 2021-02-23 RX ORDER — PROPOFOL 10 MG/ML
INJECTION, EMULSION INTRAVENOUS CONTINUOUS PRN
Status: DISCONTINUED | OUTPATIENT
Start: 2021-02-23 | End: 2021-02-23 | Stop reason: SDUPTHER

## 2021-02-23 RX ORDER — FENTANYL CITRATE 50 UG/ML
25 INJECTION, SOLUTION INTRAMUSCULAR; INTRAVENOUS EVERY 5 MIN PRN
Status: DISCONTINUED | OUTPATIENT
Start: 2021-02-23 | End: 2021-02-23 | Stop reason: HOSPADM

## 2021-02-23 RX ORDER — OXYCODONE HYDROCHLORIDE 5 MG/1
15 TABLET ORAL EVERY 4 HOURS PRN
Status: CANCELLED | OUTPATIENT
Start: 2021-02-23

## 2021-02-23 RX ORDER — NICOTINE POLACRILEX 4 MG
15 LOZENGE BUCCAL PRN
Status: CANCELLED | OUTPATIENT
Start: 2021-02-23

## 2021-02-23 RX ORDER — SENNA AND DOCUSATE SODIUM 50; 8.6 MG/1; MG/1
1 TABLET, FILM COATED ORAL 2 TIMES DAILY
Status: CANCELLED | OUTPATIENT
Start: 2021-02-23

## 2021-02-23 RX ORDER — ONDANSETRON 2 MG/ML
4 INJECTION INTRAMUSCULAR; INTRAVENOUS EVERY 6 HOURS PRN
Status: CANCELLED | OUTPATIENT
Start: 2021-02-23

## 2021-02-23 RX ORDER — FLUTICASONE PROPIONATE 50 MCG
1 SPRAY, SUSPENSION (ML) NASAL DAILY
Status: CANCELLED | OUTPATIENT
Start: 2021-02-23

## 2021-02-23 RX ORDER — OXYCODONE HYDROCHLORIDE 5 MG/1
5 TABLET ORAL ONCE
Status: COMPLETED | OUTPATIENT
Start: 2021-02-23 | End: 2021-02-23

## 2021-02-23 RX ORDER — ASPIRIN 81 MG/1
81 TABLET ORAL DAILY
Status: CANCELLED | OUTPATIENT
Start: 2021-02-24

## 2021-02-23 RX ORDER — BUDESONIDE AND FORMOTEROL FUMARATE DIHYDRATE 160; 4.5 UG/1; UG/1
2 AEROSOL RESPIRATORY (INHALATION) 2 TIMES DAILY
Status: CANCELLED | OUTPATIENT
Start: 2021-02-23

## 2021-02-23 RX ORDER — OXYCODONE HCL 10 MG/1
10 TABLET, FILM COATED, EXTENDED RELEASE ORAL ONCE
Status: COMPLETED | OUTPATIENT
Start: 2021-02-23 | End: 2021-02-23

## 2021-02-23 RX ORDER — CELECOXIB 200 MG/1
400 CAPSULE ORAL ONCE
Status: COMPLETED | OUTPATIENT
Start: 2021-02-23 | End: 2021-02-23

## 2021-02-23 RX ORDER — MIDAZOLAM HYDROCHLORIDE 1 MG/ML
INJECTION INTRAMUSCULAR; INTRAVENOUS PRN
Status: DISCONTINUED | OUTPATIENT
Start: 2021-02-23 | End: 2021-02-23 | Stop reason: SDUPTHER

## 2021-02-23 RX ORDER — ONDANSETRON 2 MG/ML
4 INJECTION INTRAMUSCULAR; INTRAVENOUS
Status: DISCONTINUED | OUTPATIENT
Start: 2021-02-23 | End: 2021-02-23 | Stop reason: HOSPADM

## 2021-02-23 RX ORDER — DILTIAZEM HYDROCHLORIDE 180 MG/1
1 CAPSULE, EXTENDED RELEASE ORAL DAILY
Status: CANCELLED | OUTPATIENT
Start: 2021-02-23

## 2021-02-23 RX ORDER — MAGNESIUM HYDROXIDE 1200 MG/15ML
LIQUID ORAL CONTINUOUS PRN
Status: COMPLETED | OUTPATIENT
Start: 2021-02-23 | End: 2021-02-23

## 2021-02-23 RX ORDER — SPIRONOLACTONE 50 MG/1
1 TABLET, FILM COATED ORAL 2 TIMES DAILY
Status: CANCELLED | OUTPATIENT
Start: 2021-02-23

## 2021-02-23 RX ORDER — SCOLOPAMINE TRANSDERMAL SYSTEM 1 MG/1
1 PATCH, EXTENDED RELEASE TRANSDERMAL
Status: DISCONTINUED | OUTPATIENT
Start: 2021-02-23 | End: 2021-02-23 | Stop reason: HOSPADM

## 2021-02-23 RX ORDER — DEXAMETHASONE SODIUM PHOSPHATE 4 MG/ML
4 INJECTION, SOLUTION INTRA-ARTICULAR; INTRALESIONAL; INTRAMUSCULAR; INTRAVENOUS; SOFT TISSUE
Status: DISCONTINUED | OUTPATIENT
Start: 2021-02-23 | End: 2021-02-23 | Stop reason: HOSPADM

## 2021-02-23 RX ORDER — TROSPIUM CHLORIDE 20 MG/1
20 TABLET, FILM COATED ORAL NIGHTLY
Status: CANCELLED | OUTPATIENT
Start: 2021-02-23

## 2021-02-23 RX ORDER — SODIUM CHLORIDE 0.9 % (FLUSH) 0.9 %
10 SYRINGE (ML) INJECTION EVERY 12 HOURS SCHEDULED
Status: CANCELLED | OUTPATIENT
Start: 2021-02-23

## 2021-02-23 RX ORDER — AMLODIPINE BESYLATE 5 MG/1
1 TABLET ORAL DAILY
Status: CANCELLED | OUTPATIENT
Start: 2021-02-23

## 2021-02-23 RX ORDER — APREPITANT 40 MG/1
40 CAPSULE ORAL ONCE
Status: COMPLETED | OUTPATIENT
Start: 2021-02-23 | End: 2021-02-23

## 2021-02-23 RX ORDER — SODIUM CHLORIDE 9 MG/ML
INJECTION, SOLUTION INTRAVENOUS CONTINUOUS
Status: CANCELLED | OUTPATIENT
Start: 2021-02-23

## 2021-02-23 RX ORDER — METHOCARBAMOL 500 MG/1
500 TABLET, FILM COATED ORAL 4 TIMES DAILY
Status: CANCELLED | OUTPATIENT
Start: 2021-02-23

## 2021-02-23 RX ORDER — DEXAMETHASONE SODIUM PHOSPHATE 4 MG/ML
4 INJECTION, SOLUTION INTRA-ARTICULAR; INTRALESIONAL; INTRAMUSCULAR; INTRAVENOUS; SOFT TISSUE ONCE
Status: COMPLETED | OUTPATIENT
Start: 2021-02-23 | End: 2021-02-23

## 2021-02-23 RX ORDER — LOSARTAN POTASSIUM 100 MG/1
1 TABLET ORAL DAILY
Status: CANCELLED | OUTPATIENT
Start: 2021-02-23

## 2021-02-23 RX ORDER — ACETAMINOPHEN 500 MG
1000 TABLET ORAL ONCE
Status: COMPLETED | OUTPATIENT
Start: 2021-02-23 | End: 2021-02-23

## 2021-02-23 RX ORDER — OXYCODONE HYDROCHLORIDE 5 MG/1
5 TABLET ORAL EVERY 6 HOURS PRN
Qty: 28 TABLET | Refills: 0 | Status: SHIPPED | OUTPATIENT
Start: 2021-02-23 | End: 2021-03-02

## 2021-02-23 RX ORDER — GLIPIZIDE 10 MG/1
10 TABLET ORAL
Status: CANCELLED | OUTPATIENT
Start: 2021-02-23

## 2021-02-23 RX ORDER — LIDOCAINE HYDROCHLORIDE 20 MG/ML
INJECTION, SOLUTION EPIDURAL; INFILTRATION; INTRACAUDAL; PERINEURAL PRN
Status: DISCONTINUED | OUTPATIENT
Start: 2021-02-23 | End: 2021-02-23 | Stop reason: SDUPTHER

## 2021-02-23 RX ORDER — TAMSULOSIN HYDROCHLORIDE 0.4 MG/1
0.4 CAPSULE ORAL DAILY
Status: CANCELLED | OUTPATIENT
Start: 2021-02-23

## 2021-02-23 RX ORDER — DEXTROSE MONOHYDRATE 25 G/50ML
12.5 INJECTION, SOLUTION INTRAVENOUS PRN
Status: CANCELLED | OUTPATIENT
Start: 2021-02-23

## 2021-02-23 RX ORDER — MIDAZOLAM HYDROCHLORIDE 1 MG/ML
INJECTION INTRAMUSCULAR; INTRAVENOUS
Status: COMPLETED
Start: 2021-02-23 | End: 2021-02-23

## 2021-02-23 RX ORDER — ACETAMINOPHEN 500 MG
1000 TABLET ORAL EVERY 8 HOURS SCHEDULED
Status: CANCELLED | OUTPATIENT
Start: 2021-02-23

## 2021-02-23 RX ORDER — DEXTROSE MONOHYDRATE 50 MG/ML
100 INJECTION, SOLUTION INTRAVENOUS PRN
Status: CANCELLED | OUTPATIENT
Start: 2021-02-23

## 2021-02-23 RX ORDER — SODIUM CHLORIDE 0.9 % (FLUSH) 0.9 %
10 SYRINGE (ML) INJECTION PRN
Status: CANCELLED | OUTPATIENT
Start: 2021-02-23

## 2021-02-23 RX ORDER — KETOROLAC TROMETHAMINE 15 MG/ML
15 INJECTION, SOLUTION INTRAMUSCULAR; INTRAVENOUS EVERY 6 HOURS
Status: CANCELLED | OUTPATIENT
Start: 2021-02-23 | End: 2021-02-28

## 2021-02-23 RX ADMIN — LIDOCAINE HYDROCHLORIDE 5 ML: 20 INJECTION, SOLUTION EPIDURAL; INFILTRATION; INTRACAUDAL; PERINEURAL at 08:11

## 2021-02-23 RX ADMIN — TRANEXAMIC ACID 1000 MG: 1 INJECTION, SOLUTION INTRAVENOUS at 08:56

## 2021-02-23 RX ADMIN — LIDOCAINE HYDROCHLORIDE 2.5 ML: 20 INJECTION, SOLUTION EPIDURAL; INFILTRATION; INTRACAUDAL; PERINEURAL at 08:44

## 2021-02-23 RX ADMIN — SODIUM CHLORIDE, POTASSIUM CHLORIDE, SODIUM LACTATE AND CALCIUM CHLORIDE: 600; 310; 30; 20 INJECTION, SOLUTION INTRAVENOUS at 07:24

## 2021-02-23 RX ADMIN — APREPITANT 40 MG: 40 CAPSULE ORAL at 07:46

## 2021-02-23 RX ADMIN — FENTANYL CITRATE 25 MCG: 50 INJECTION INTRAMUSCULAR; INTRAVENOUS at 10:48

## 2021-02-23 RX ADMIN — OXYCODONE HYDROCHLORIDE 10 MG: 10 TABLET, FILM COATED, EXTENDED RELEASE ORAL at 07:25

## 2021-02-23 RX ADMIN — SODIUM CHLORIDE, POTASSIUM CHLORIDE, SODIUM LACTATE AND CALCIUM CHLORIDE: 600; 310; 30; 20 INJECTION, SOLUTION INTRAVENOUS at 08:18

## 2021-02-23 RX ADMIN — LIDOCAINE HYDROCHLORIDE 2.5 ML: 20 INJECTION, SOLUTION EPIDURAL; INFILTRATION; INTRACAUDAL; PERINEURAL at 09:13

## 2021-02-23 RX ADMIN — MIDAZOLAM HYDROCHLORIDE 2 MG: 2 INJECTION, SOLUTION INTRAMUSCULAR; INTRAVENOUS at 07:35

## 2021-02-23 RX ADMIN — PROPOFOL 50 MCG/KG/MIN: 10 INJECTION, EMULSION INTRAVENOUS at 08:26

## 2021-02-23 RX ADMIN — SODIUM CHLORIDE, POTASSIUM CHLORIDE, SODIUM LACTATE AND CALCIUM CHLORIDE: 600; 310; 30; 20 INJECTION, SOLUTION INTRAVENOUS at 10:00

## 2021-02-23 RX ADMIN — HYDROMORPHONE HYDROCHLORIDE 0.25 MG: 1 INJECTION, SOLUTION INTRAMUSCULAR; INTRAVENOUS; SUBCUTANEOUS at 11:12

## 2021-02-23 RX ADMIN — CEFAZOLIN 3 G: 10 INJECTION, POWDER, FOR SOLUTION INTRAVENOUS at 08:30

## 2021-02-23 RX ADMIN — HYDROMORPHONE HYDROCHLORIDE 0.5 MG: 1 INJECTION, SOLUTION INTRAMUSCULAR; INTRAVENOUS; SUBCUTANEOUS at 11:37

## 2021-02-23 RX ADMIN — MIDAZOLAM HYDROCHLORIDE 2 MG: 2 INJECTION, SOLUTION INTRAMUSCULAR; INTRAVENOUS at 07:45

## 2021-02-23 RX ADMIN — OXYCODONE 5 MG: 5 TABLET ORAL at 13:15

## 2021-02-23 RX ADMIN — DEXAMETHASONE SODIUM PHOSPHATE 4 MG: 4 INJECTION, SOLUTION INTRA-ARTICULAR; INTRALESIONAL; INTRAMUSCULAR; INTRAVENOUS; SOFT TISSUE at 07:25

## 2021-02-23 RX ADMIN — VANCOMYCIN HYDROCHLORIDE 1750 MG: 10 INJECTION, POWDER, LYOPHILIZED, FOR SOLUTION INTRAVENOUS at 07:24

## 2021-02-23 RX ADMIN — ACETAMINOPHEN 1000 MG: 500 TABLET ORAL at 07:24

## 2021-02-23 RX ADMIN — LIDOCAINE HYDROCHLORIDE 2.5 ML: 20 INJECTION, SOLUTION EPIDURAL; INFILTRATION; INTRACAUDAL; PERINEURAL at 09:28

## 2021-02-23 RX ADMIN — CELECOXIB 400 MG: 200 CAPSULE ORAL at 07:24

## 2021-02-23 RX ADMIN — PHENYLEPHRINE HYDROCHLORIDE 100 MCG: 10 INJECTION, SOLUTION INTRAMUSCULAR; INTRAVENOUS; SUBCUTANEOUS at 08:30

## 2021-02-23 RX ADMIN — LIDOCAINE HYDROCHLORIDE 5 ML: 20 INJECTION, SOLUTION EPIDURAL; INFILTRATION; INTRACAUDAL; PERINEURAL at 08:12

## 2021-02-23 ASSESSMENT — PULMONARY FUNCTION TESTS
PIF_VALUE: 0
PIF_VALUE: 1
PIF_VALUE: 0
PIF_VALUE: 1
PIF_VALUE: 0
PIF_VALUE: 1
PIF_VALUE: 0
PIF_VALUE: 1
PIF_VALUE: 0
PIF_VALUE: 1

## 2021-02-23 ASSESSMENT — PAIN SCALES - GENERAL
PAINLEVEL_OUTOF10: 7
PAINLEVEL_OUTOF10: 5
PAINLEVEL_OUTOF10: 5
PAINLEVEL_OUTOF10: 7
PAINLEVEL_OUTOF10: 6

## 2021-02-23 ASSESSMENT — PAIN DESCRIPTION - PAIN TYPE: TYPE: SURGICAL PAIN

## 2021-02-23 ASSESSMENT — PAIN DESCRIPTION - ORIENTATION: ORIENTATION: RIGHT

## 2021-02-23 ASSESSMENT — PAIN - FUNCTIONAL ASSESSMENT
PAIN_FUNCTIONAL_ASSESSMENT: 0-10
PAIN_FUNCTIONAL_ASSESSMENT: 0-10

## 2021-02-23 ASSESSMENT — PAIN DESCRIPTION - LOCATION: LOCATION: KNEE

## 2021-02-23 NOTE — H&P
[facility]    [id number]    [facility] Same Day Surgery Update H & P  Department of General Surgery   Surgical Service   Pre-operative History and Physical  Last H & P within the last 30 days. DIAGNOSIS:   Primary osteoarthritis of right knee [M17.11]    Procedure(s):  RIGHT MINIMALLY INVASIVE TOTAL KNEE ARTHROPALSTY     HISTORY OF PRESENT ILLNESS:   Patient is a morbidly obese (Body mass index is 42.13 kg/m².), 66 y.o. female with right knee pain, swelling and instability in the setting of arthrosis. The symptoms have been recalcitrant to conservative treatment and the patient presents today for the above procedure. Covid 19:  Patient denies fever, chills, cough or known exposure to Covid-19.       Past Medical History:        Diagnosis Date    Arthritis     Asthma     Atrial fibrillation (HCC)     Cancer (HCC)     skin    Degenerative disc disease, lumbar     Diabetes mellitus (HCC)     Hyperlipidemia     Hypertension     PONV (postoperative nausea and vomiting)      Past Surgical History:        Procedure Laterality Date    APPENDECTOMY      BREAST LUMPECTOMY      BREAST SURGERY Bilateral     biopsy    CARPAL TUNNEL RELEASE Bilateral     HYSTERECTOMY      KNEE ARTHROSCOPY Right     NICK AND BSO      TONSILLECTOMY       Past Social History:  Social History     Socioeconomic History    Marital status:      Spouse name: None    Number of children: None    Years of education: None    Highest education level: None   Occupational History    None   Social Needs    Financial resource strain: None    Food insecurity     Worry: None     Inability: None    Transportation needs     Medical: None     Non-medical: None   Tobacco Use    Smoking status: Never Smoker    Smokeless tobacco: Never Used   Substance and Sexual Activity    Alcohol use: Yes     Comment: social     Drug use: No    Sexual activity: Yes   Lifestyle    Physical activity     Days per week: None Minutes per session: None    Stress: None   Relationships    Social connections     Talks on phone: None     Gets together: None     Attends Confucianist service: None     Active member of club or organization: None     Attends meetings of clubs or organizations: None     Relationship status: None    Intimate partner violence     Fear of current or ex partner: None     Emotionally abused: None     Physically abused: None     Forced sexual activity: None   Other Topics Concern    None   Social History Narrative    None         Medications Prior to Admission:      Prior to Admission medications    Medication Sig Start Date End Date Taking?  Authorizing Provider   MYRBETRIQ 50 MG TB24 TAKE 1 TABLET BY MOUTH DAILY 1/29/21  Yes Historical Provider, MD   albuterol sulfate  (90 Base) MCG/ACT inhaler USE 2 INHALATIONS EVERY 6 HOURS AS NEEDED 7/27/20  Yes Historical Provider, MD   amLODIPine (NORVASC) 5 MG tablet TAKE 1 TABLET BY MOUTH DAILY 1/6/21  Yes Historical Provider, MD   dilTIAZem (TIAZAC) 180 MG extended release capsule TAKE 1 CAPSULE DAILY 7/20/20  Yes Historical Provider, MD   ergocalciferol (ERGOCALCIFEROL) 1.25 MG (25323 UT) capsule TAKE 1 CAPSULE EVERY 30 DAYS 3/2/20  Yes Historical Provider, MD   tiotropium (SPIRIVA RESPIMAT) 1.25 MCG/ACT AERS inhaler Inhale 2 sprays into the lungs daily 2/5/21  Yes Historical Provider, MD   Naproxen Sodium (ALEVE PO) Take by mouth 2 times daily   Yes Historical Provider, MD   spironolactone (ALDACTONE) 50 MG tablet TAKE 1 TABLET TWICE A DAY 9/18/17  Yes Historical Provider, MD   glipiZIDE (GLUCOTROL XL) 10 MG extended release tablet TAKE 1 TABLET DAILY 8/14/17  Yes Historical Provider, MD   metFORMIN (GLUCOPHAGE) 1000 MG tablet TAKE 1 TABLET TWICE A DAY WITH MEALS 3/12/17  Yes Historical Provider, MD   aclidinium (TUDORZA PRESSAIR) 400 MCG/ACT AEPB inhaler Inhale 1 puff into the lungs 1/8/17  Yes Historical Provider, MD   losartan (COZAAR) 100 MG tablet TAKE 1

## 2021-02-23 NOTE — PROGRESS NOTES
PACU Transfer to [facility]  Pt's Current Allergies: Atorvastatin and Pioglitazone    Pt meets criteria to transfer to next phase of care per JOVANI SCORE and ASPAN standards    Recent Labs     02/23/21  0719 02/23/21  1106   POCGLU 124* 159*       Temp: 96.6  HR 92  O2 91% on room air      Intake/Output Summary (Last 24 hours) at 2/23/2021 1502  Last data filed at 2/23/2021 1315  Gross per 24 hour   Intake 1000 ml   Output 400 ml   Net 600 ml     Ace wrap to RLE is CDI. Pain assessment:  None - \"unless I move my right leg\"    Patient was assessed for unknown alterations to skin integrity. There were no unknown alterations observed. Patient transferred to care of Walter Larsen RN.   Family ( Desirae Arriaga) updated and directed to Walter Larsen    2/23/2021 3:02 PM

## 2021-02-23 NOTE — PROGRESS NOTES
Physical Therapy    Facility/Department: [facility] GENERAL SURGERY  Initial Assessment and Treatment and Possible Discharge    NAME: Yuko Ledesma  : 1950  MRN: [id number]    Date of Service: 2021    Discharge Recommendations:    [facility] scored a  18/24 on the AM-PAC short mobility form. Current research shows that an AM-PAC score of 18 or greater is typically associated with a discharge to the patient's home setting. Based on the patient's AM-PAC score and their current functional mobility deficits, it is recommended that the patient have 2-3 sessions per week of Physical Therapy at d/c to increase the patient's independence. At this time, this patient demonstrates the endurance and safety to discharge home with (home vs OP services) and a follow up treatment frequency of 2-3x/wk. Please see assessment section for further patient specific details. If patient discharges prior to next session this note will serve as a discharge summary. Please see below for the latest assessment towards goals. PT Equipment Recommendations  Equipment Needed: No    Assessment   Assessment: Pt is 65 yo F with decreased mobility following TKR. Pt is hopeful to return home with 's assist and home PT. No DME needs. Will follow if d/c today delayed. Treatment Diagnosis: Decreased functional mobility  Prognosis: Good  Decision Making: Medium Complexity  PT Education: PT Role;General Safety  Patient Education: Pt verbalized understanding  REQUIRES PT FOLLOW UP: Yes  Activity Tolerance  Activity Tolerance: Patient Tolerated treatment well       Patient Diagnosis(es): The encounter diagnosis was Arthritis of right knee. has a past medical history of Arthritis, Asthma, Atrial fibrillation ([address] Cancer ([address] Degenerative disc disease, lumbar, Diabetes mellitus ([address] Hyperlipidemia, Hypertension, and PONV (postoperative nausea and vomiting).    has a past surgical history that includes Appendectomy; Knee arthroscopy (Right); Breast surgery (Bilateral); Breast lumpectomy; Carpal tunnel release (Bilateral); Hysterectomy; Tonsillectomy; and selena and bso (cervix removed). Restrictions  Position Activity Restriction  Other position/activity restrictions: WBAT R LE  Vision/Hearing  Vision: Impaired  Vision Exceptions: Wears glasses at all times  Hearing: Exceptions to [facility]  Hearing Exceptions: Hard of hearing/hearing concerns     Subjective  General  Chart Reviewed: Yes  Additional Pertinent Hx: Pt admitted on 2/23/21 for TKR. H/o OA, skin cancer, asthma, DM, afib.   Family / Caregiver Present: No  Referring Practitioner: Dr. Yariel Quiros  Diagnosis: Primary OA of R knee  Subjective  Subjective: Pt supine in bed and agreeable to PT  Pain Screening  Patient Currently in Pain: No(\"Unless I move my knee\")          Orientation  Orientation  Overall Orientation Status: Within Functional Limits  Social/Functional History  Social/Functional History  Lives With: Spouse  Type of Home: House  Home Layout: One level  Home Access: Stairs to enter with rails  Entrance Stairs - Number of Steps: 3  Bathroom Shower/Tub: Walk-in shower  Bathroom Toilet: Standard  Bathroom Equipment: Shower chair, Grab bars in shower  Home Equipment: [employer], 4 wheeled walker, Rolling walker  ADL Assistance: Independent  Homemaking Assistance: Needs assistance(Shares duties with )  Ambulation Assistance: Independent(With wheeled walker)  Active : Yes  Occupation: Retired  Type of occupation: Hospice nurse  Leisure & Hobbies: Visit relatives  Cognition        Objective          AROM RLE (degrees)  RLE General AROM: Knee 12-65 flexion  AROM LLE (degrees)  LLE AROM : WNL  Strength RLE  Comment: >3/5  Strength LLE  Strength LLE: WFL        Bed mobility  Supine to Sit: Stand by assistance  Sit to Supine: Stand by assistance  Transfers  Sit to Stand: Stand by assistance  Stand to sit: Stand by assistance  Ambulation  Ambulation?: Yes  Ambulation 1  Device: Rollator  Assistance: Contact guard assistance  Gait Deviations: Slow Olga;Decreased step length;Decreased step height  Distance: 50 feet x 2 and 15 feet   Pt ambulated up and down 3 steps with rail and CGA     Exercises  Comments: Pt performed R TKR HEP x 10 reps     Plan   Plan  Times per week: 7  Times per day: Twice a day  Current Treatment Recommendations: Strengthening, Transfer Training, Balance Training, Functional Mobility Training, Stair training, Gait Training, Home Exercise Program, Safety Education & Training  Safety Devices  Type of devices: Call light within reach, Chair alarm in place, Left in chair, Nurse notified    G-Code       OutComes Score                                                  AM-PAC Score             Goals  Short term goals  Time Frame for Short term goals: by discharge  Short term goal 1: Sit to stand with supervision  Short term goal 2: Pt will ambulate 80 feet with wheeled walker and SBA  Short term goal 3: Pt will perform TKR HEP x 10 reps with cues       Therapy Time   Individual Concurrent Group Co-treatment   Time In 1336         Time Out 1420         Minutes 44           Timed Code Treatment Minutes:   29    Total Treatment Minutes:  1 Beverly Cook, PT

## 2021-02-23 NOTE — ANESTHESIA PRE PROCEDURE
Department of Anesthesiology  Preprocedure Note       Name:  Lit Arce   Age:  66 y.o.  :  1950                                          MRN:  [id number]         Date:  2021      Surgeon: Clau Sanderson):  Lawyer David MD    Procedure: Procedure(s):  RIGHT MINIMALLY INVASIVE TOTAL KNEE ARTHROPALSTY    Medications prior to admission:   Prior to Admission medications    Medication Sig Start Date End Date Taking? Authorizing Provider   oxyCODONE (ROXICODONE) 5 MG immediate release tablet Take 1 tablet by mouth every 6 hours as needed for Pain for up to 7 days. Intended supply: 7 days.  Take lowest dose possible to manage pain 2/23/21 3/2/21 Yes Lawyer David MD   MYRBETRIQ 50 MG TB24 TAKE 1 TABLET BY MOUTH DAILY 21  Yes Historical Provider, MD   albuterol sulfate  (90 Base) MCG/ACT inhaler USE 2 INHALATIONS EVERY 6 HOURS AS NEEDED 20  Yes Historical Provider, MD   amLODIPine (NORVASC) 5 MG tablet TAKE 1 TABLET BY MOUTH DAILY 21  Yes Historical Provider, MD   aspirin 81 MG chewable tablet 81 mg daily   Yes Historical Provider, MD   budesonide-formoterol (SYMBICORT) 160-4.5 MCG/ACT AERO INHALE 2 PUFFS BY MOUTH TWICE DAILY 2/3/21  Yes Historical Provider, MD   dilTIAZeolga [facility]) 180 MG extended release capsule TAKE 1 CAPSULE DAILY 20  Yes Historical Provider, MD   ergocalciferol (ERGOCALCIFEROL) 1.25 MG (71199 UT) capsule TAKE 1 CAPSULE EVERY 30 DAYS 3/2/20  Yes Historical Provider, MD   tiotropium (SPIRIVA RESPIMAT) 1.25 MCG/ACT AERS inhaler Inhale 2 sprays into the lungs daily 21  Yes Historical Provider, MD   Naproxen Sodium (ALEVE PO) Take by mouth 2 times daily   Yes Historical Provider, MD   fluticasone (FLONASE) 50 MCG/ACT nasal spray 1 spray by Nasal route 17  Yes Historical Provider, MD   spironolactone (ALDACTONE) 50 MG tablet TAKE 1 TABLET TWICE A DAY 17  Yes Historical Provider, MD   glipiZIDE (GLUCOTROL XL) 10 MG extended release tablet TAKE 1 TABLET DAILY 8/14/17  Yes Historical Provider, MD   metFORMIN (GLUCOPHAGE) 1000 MG tablet TAKE 1 TABLET TWICE A DAY WITH MEALS 3/12/17  Yes Historical Provider, MD   ibuprofen (ADVIL;MOTRIN) 800 MG tablet TAKE 1 TABLET BY MOUTH EVERY EIGHT HOURS AS NEEDED 5/31/17  Yes Historical Provider, MD   losartan (COZAAR) 100 MG tablet TAKE 1 TABLET DAILY 8/16/17  Yes Historical Provider, MD   acetaminophen (TYLENOL) 500 MG tablet Take 1,000 mg by mouth 2 times daily    Historical Provider, MD       Current medications:    Current Facility-Administered Medications   Medication Dose Route Frequency Provider Last Rate Last Admin    lactated ringers infusion   Intravenous Continuous Juan M Dupont  mL/hr at 02/23/21 0724 New Bag at 02/23/21 0724    ceFAZolin (ANCEF) 3,000 mg in dextrose 5 % 100 mL IVPB  3,000 mg Intravenous Once Juan M Dupont MD        vancomycin (VANCOCIN) 1,750 mg in dextrose 5 % 250 mL IVPB  15 mg/kg Intravenous Once Juan M Dupont  mL/hr at 02/23/21 0724 1,750 mg at 02/23/21 2560    ortho mix (with morphine) injection   Injection On Call Juan M Dupont MD        tranexamic acid (CYKLOKAPRON) 1,000 mg in sodium chloride 0.9 % 50 mL IVPB  1,000 mg Intravenous Once Juan M Dupont MD        lactated ringers infusion   Intravenous Continuous Joo Carroll DO        scopolamine (TRANSDERM-SCOP) transdermal patch 1 patch  1 patch Transdermal Q72H Adonis Orlando DO   1 patch at 02/23/21 2507       Allergies:     Allergies   Allergen Reactions    Atorvastatin Swelling     Joint pain    Pioglitazone Swelling       Problem List:    Patient Active Problem List   Diagnosis Code    Plantar fasciitis, left M72.2    Tenosynovitis of foot M65.9    Arthritis of right knee M17.11       Past Medical History:        Diagnosis Date    Arthritis     Asthma     [facility]     Cancer ([facility]     skin    Degenerative disc disease, lumbar     [facility]  Hyperlipidemia     Hypertension     PONV (postoperative nausea and vomiting)        Past Surgical History:        Procedure Laterality Date    APPENDECTOMY      BREAST LUMPECTOMY      BREAST SURGERY Bilateral     biopsy    CARPAL TUNNEL RELEASE Bilateral     HYSTERECTOMY      KNEE ARTHROSCOPY Right     NICK AND BSO      TONSILLECTOMY         Social History:    Social History     Tobacco Use    Smoking status: Never Smoker    Smokeless tobacco: Never Used   Substance Use Topics    Alcohol use: Yes     Comment: social                                 Counseling given: Not Answered      Vital Signs (Current):   Vitals:    02/23/21 0627 02/23/21 0737 02/23/21 0742 02/23/21 0751   BP: (!) 153/83 (!) 146/75 111/65 119/68   Pulse: 78 67 66 80   Resp: 18 16 16 16   Temp: 98.4 °F (36.9 °C)      TempSrc: Oral      SpO2: 95% 97% 97% 96%   Weight: 265 lb (120.2 kg)      Height: 5' 6.5\" (1.689 m)                                                 BP Readings from Last 3 Encounters:   02/23/21 119/68   11/09/17 133/70   10/05/17 135/75       NPO Status: Time of last liquid consumption: 0500(sips)                        Time of last solid consumption: 2030                        Date of last liquid consumption: 02/23/21                        Date of last solid food consumption: 02/22/21    BMI:   Wt Readings from Last 3 Encounters:   02/23/21 265 lb (120.2 kg)   11/09/17 246 lb 0.5 oz (111.6 kg)   10/05/17 246 lb 0.5 oz (111.6 kg)     Body mass index is 42.13 kg/m².     CBC: No results found for: WBC, RBC, HGB, HCT, MCV, RDW, PLT    CMP: No results found for: NA, K, CL, CO2, BUN, CREATININE, GFRAA, AGRATIO, LABGLOM, GLUCOSE, PROT, CALCIUM, BILITOT, ALKPHOS, AST, ALT    POC Tests:   Recent Labs     02/23/21  0719   POCGLU 124*       Coags: No results found for: PROTIME, INR, APTT    HCG (If Applicable): No results found for: PREGTESTUR, PREGSERUM, HCG, HCGQUANT     ABGs: No results found for: PHART, PO2ART, YIA0APF, MFV1SHT, BEART, B7BQAWOV     Type & Screen (If Applicable):  No results found for: LABABO, LABRH    Drug/Infectious Status (If Applicable):  No results found for: HIV, HEPCAB    COVID-19 Screening (If Applicable):   Lab Results   Component Value Date    COVID19 NOT DETECTED 02/17/2021         Anesthesia Evaluation  Patient summary reviewed and Nursing notes reviewed   history of anesthetic complications: PONV. Airway: Mallampati: III  TM distance: >3 FB   Neck ROM: full  Mouth opening: > = 3 FB Dental: normal exam         Pulmonary:normal exam  breath sounds clear to auscultation  (+) asthma: seasonal asthma,           Patient did not smoke on day of surgery. Cardiovascular:Negative CV ROS  Exercise tolerance: good (>4 METS),   (+) hypertension: mild,       ECG reviewed  Rhythm: regular  Rate: normal           Beta Blocker:  Not on Beta Blocker         Neuro/Psych:   Negative Neuro/Psych ROS              GI/Hepatic/Renal: Neg GI/Hepatic/Renal ROS  (+) morbid obesity          Endo/Other:    (+) DiabetesType II DM, well controlled, , .                 Abdominal:       Abdomen: soft. Vascular: negative vascular ROS. Anesthesia Plan      regional     ASA 3       Induction: intravenous. MIPS: Postoperative opioids intended and Prophylactic antiemetics administered. Anesthetic plan and risks discussed with patient. Use of blood products discussed with patient whom consented to blood products. Plan discussed with CRNA.     Attending anesthesiologist reviewed and agrees with Pre Eval content              Forest Merchant DO   2/23/2021

## 2021-02-23 NOTE — ANESTHESIA PROCEDURE NOTES
Epidural Block    Patient location during procedure: pre-op  Start time: 2/23/2021 7:30 AM  End time: 2/23/2021 7:45 AM  Reason for block: procedure for pain and post-op pain management  Staffing  Anesthesiologist: Grey Shepard DO  Preanesthetic Checklist  Completed: patient identified, IV checked, site marked, risks and benefits discussed, surgical consent, monitors and equipment checked, pre-op evaluation, timeout performed, anesthesia consent given, oxygen available and patient being monitored  Epidural  Patient position: sitting  Prep: Betadine  Patient monitoring: cardiac monitor, continuous pulse ox and frequent blood pressure checks  Approach: midline  Location: lumbar (1-5)  Injection technique: CLARENCE saline  Provider prep: mask and sterile gloves  Needle  Needle type: Tuohy   Needle gauge: 17 G  Needle length: 2.5 in  Needle insertion depth: 8 cm  Catheter type: side hole  Catheter size: 19 G  Catheter at skin depth: 12 cm  Test dose: negative  Assessment  Sensory level: T10  Hemodynamics: stable  Attempts: 1

## 2021-02-23 NOTE — OP NOTE
PREOPERATIVE DIAGNOSIS: Right knee tricompartmental arthrosis. POSTOPERATIVE DIAGNOSIS: Right knee tricompartmental arthrosis. PROCEDURE(S) PERFORMED: Minimally Invasive right total knee arthroplasty, cemented,  cruciate-retaining. SURGEON: Caleb Meraz M.D. FIRST SURGICAL ASSISTANT:  Susu Zaman, [facility]. The Physician Assistant was necessary to perform the surgery today by assisting with application of implants and manipulation of the extremity. This help was not otherwise available in the operating room today. PRE-OPERATIVE ANTIBIOTICS:  Cefazolin 2G + 1G Vancomycin    ANESTHESIA:  Epidural + Ortho Mix    ESTIMATED BLOOD LOSS: 50 cc     INTRAVENOUS FLUIDS: 1000 cc    URINE OUTPUT: 0 cc    TOURNIQUET TIME: 61 minutes at 976 mm Hg    COMPLICATIONS: None. SPECIMENS: None. IMPLANTS:   Jh NexGen CR Flex, size Right E minus GSF femur  Jh NexGen cemented tibia Standard Keel, size 4 with 15 x 30 mm stem extension  All-polyethylene patella, size 32   Cruciate-retaining highly-crosslinked polyethylene insert, size 12 mm     OPERATIVE INDICATIONS: This patient had longstanding knee pain. They have attempted to treat this nonoperatively for years. At  this point they have exhausted all nonoperative options, and have persistent pain  and dysfunction. I offered them a total knee arthroplasty for treatment  of their symptomatic arthrosis. The risks and benefits of total knee  arthroplasty were clearly explained to the patient, and they elected to proceed  despite these risks. DETAILS OF PROCEDURE: The patient was greeted in the preoperative holding  area, and their operative extremity was identified and marked with an  indelible marker. The patient was transported to the operating theater where  anesthesia was obtained. The patient was placed supine on the operating table  table. A bump was applied under the ipsilateral hip. A tourniquet was applied  to the thigh.  The operative  extremity was prepped and draped in a standard sterile surgical fashion. The patient received intravenous antibiotics prior to the inflation of the tourniquet and  surgical incision. A final timeout was performed, verifying  correct patient, operative site and operative plan. The leg was exanguinated with an Ace bandage and the tourniquet was inflated. A minimally invasive medial-based incision was placed on the anterior medial knee. The extensor retinaculum was exposed, and a low mid-vastus approach was utilized to access the intraarticular space. Eburnated and arthritic bone was encountered. A small medial release was performed followed by resection of osteophytes from the medial plateau. The intra-patellar fat bad was resected in extension. The anterior cruciate ligament was resected and a drill was utilized to access the femoral canal.  An intramedullary minimally invasive distal femoral cutting guide was applied, and a distal femoral cut was made in 4 degrees of valgus. The knee was taken into extension where the patella was recessed followed by application of a patella protector. The minimally invasive extramedullary tibia alignment guide was pinned perpendicular to the long axis of the tibia with approximately 7 degrees of posterior slope in extension. Care was taken to preserve the  bone island for the posterior cruciate ligament. The tibia resection was removed in medial and lateral segments followed by excision of the medial and lateral menisci. The bipolar sealer was utilized to cauterize the posterior capsule and geniculate arteries for improved hemostasis. The femoral sizing guide was pinned in 3 degrees of external rotation. After sizing the femur in extension, drill holes were placed and the sizing guide was removed. The size E 4-in-1 cutting guide was pinned in placed followed by anterior, posterior and chamfer resections with an oscillating saw.    The 4-in-1 guide was bandage. The tourniquet was deflated and the patient was transported to their hospital bed. The patient was transported to the post-operative anesthesia care unit in stable condition. All sponge and needle counts were correct x 2. The patient's total operative time, difficulty and kulwant-operative care was increased secondary to the patient's Body Mass Index of 42.1.

## 2021-02-23 NOTE — PROGRESS NOTES
Patient admitted to PACU # 18 from OR at [address] - Right per Dr Anibal Gaston. Attached to PACU monitoring system and report received from anesthesia provider. Patient was reported to be hemodynamically stable during procedure. Patient drowsy on admission and in moderate pain to right knee/surgical site. Ace wrap in place on RLE and CDI.

## 2021-02-23 NOTE — PROGRESS NOTES
OA  Subjective  Subjective: Pt in PACU bed, agreeable to eval. RN okayed therapy. Pt hopeful to d/c home today  Patient Currently in Pain: No(\"Unless I move my knee\"- pain not rated)    Social/Functional History  Social/Functional History  Lives With: Spouse  Type of Home: House  Home Layout: One level  Home Access: Stairs to enter with rails  Entrance Stairs - Number of Steps: 3  Bathroom Shower/Tub: Walk-in shower  Bathroom Toilet: Standard  Bathroom Equipment: Shower chair, Grab bars in shower  Home Equipment: Colen Cower, 4 wheeled walker, Rolling walker  ADL Assistance: [address]: Needs assistance(Shares duties with )  Ambulation Assistance: Independent(With wheeled walker)  Active : Yes  Occupation: Retired  Type of occupation: Hospice nurse  Leisure & Hobbies: Visit relatives       Objective   Vision: (decreased vision L eye)  Vision Exceptions: Wears glasses at all times  Hearing: Exceptions to [facility]  Hearing Exceptions: Hard of hearing/hearing concerns       Orientation  Overall Orientation Status: Within Normal Limits        Functional Mobility  Functional - Mobility Device: 4-Wheeled Walker  Activity: To/from bathroom; Other(>50')  Assist Level: Contact guard assistance(progressing to sba)  Functional Mobility Comments: slow pace 2/2 pain and decreased ROM R knee, no LOB observed    Toilet Transfers  Toilet - Technique: Ambulating  Equipment Used: Standard toilet(L grab bar)  Toilet Transfer: Contact guard assistance  Toilet Transfers Comments: VC to use walker vs pull on walker    ADL  Feeding: Independent  Grooming: Stand by assistance(hand hygiene standing at sink)  UE Dressing: Independent(don shirt, bra)  LE Dressing: Minimal assistance(don underwear/pants)  Toileting: Stand by assistance   Education: Pt educated on DME recommendations and modified ADLs for increased safety/independence with ADLs.  Discussed safe shower transfer with initial family assist. Educated pt on R knee pain and edema mgmt using ice packs      Bed mobility  Supine to Sit: Stand by assistance  Sit to Supine: Stand by assistance     Transfers  Sit to stand: Contact guard assistance(bed, w/c)  Stand to sit: Stand by assistance(bed, w/c)          Cognition  Overall Cognitive Status: WFL       LUE AROM (degrees)  LUE AROM : WFL  RUE AROM (degrees)  RUE AROM : WFL                      Plan   Plan  Times per week: d/c      AM-PAC Score        AM-[facility] Inpatient Daily Activity Raw Score: 21 (02/23/21 1528)  AM-PAC Inpatient ADL T-Scale Score : 44.27 (02/23/21 1528)  ADL Inpatient CMS 0-100% Score: 32.79 (02/23/21 1528)  ADL Inpatient CMS G-Code Modifier : CJ (02/23/21 1528)      Therapy Time   Individual Concurrent Group Co-treatment   Time In 1340         Time Out 1419         Minutes 39         Timed Code Treatment Minutes: 24 Minutes  +15 min julee Dockery, OT

## 2021-02-23 NOTE — ANESTHESIA POSTPROCEDURE EVALUATION
Department of Anesthesiology  Postprocedure Note    Patient: Arthur Weir  MRN: [id number]  YOB: 1950  Date of evaluation: 2/23/2021  Time:  12:05 PM     Procedure Summary     Date: 02/23/21 Room / Location: [address]    Anesthesia Start: 0818 Anesthesia Stop: 1418    Procedure: RIGHT MINIMALLY INVASIVE TOTAL KNEE ARTHROPALSTY (Right Knee) Diagnosis:       Primary osteoarthritis of right knee      (Primary osteoarthritis of right knee [M17.11])    Surgeons: Antoine Swanson MD Responsible Provider: Abbe Hampton DO    Anesthesia Type: regional ASA Status: 3          Anesthesia Type: regional    Irwin Phase I: Irwin Score: 8    Irwin Phase II:      Last vitals: Reviewed and per EMR flowsheets.        Anesthesia Post Evaluation    Patient location during evaluation: PACU  Patient participation: complete - patient participated  Level of consciousness: awake  Pain score: 0  Airway patency: patent  Nausea & Vomiting: no nausea and no vomiting  Complications: no  Cardiovascular status: blood pressure returned to baseline  Respiratory status: acceptable  Hydration status: euvolemic

## 2021-02-25 LAB
MRSA CULTURE ONLY: ABNORMAL
ORGANISM: ABNORMAL

## 2021-03-11 NOTE — DISCHARGE SUMMARY
[address] [facility] SUMMARY    Pre Operative Diagnosis  Primary osteoarthritis of right knee [M17.11]    Post Operative Diagnosis  Primary osteoarthritis of right knee [M17.11]    Discharge DIagnosis Primary osteoarthritis of right knee [M17.11]    Procedure Preformed  Procedure(s):  RIGHT MINIMALLY INVASIVE TOTAL KNEE ARTHROPALSTY    Surgeon  Simona Ron MD     Medical course: as per medical records       The patient was taken to the operating room  where the aforementioned procedure was preformed. The patient was taken to the post operative anesthesia recovery unit in stable condition. The patient was then transferred to the orthopaedic floor for post operative pain management and convalesce. ( x )The patient was placed on anticoagulation therapy for DVT prophylaxis       The patient was discharged in stable condition. Please see medical reconciliation for discharge medications. The discharge instructions were explained to the patient and the family. The patient will follow up in the office in 3 weeks for repeat examination and xray .

## 2021-05-12 ENCOUNTER — OFFICE VISIT (OUTPATIENT)
Dept: PRIMARY CARE CLINIC | Age: 71
End: 2021-05-12
Payer: MEDICARE

## 2021-05-12 DIAGNOSIS — Z01.818 PRE-OP EXAMINATION: Primary | ICD-10-CM

## 2021-05-12 PROCEDURE — 99421 OL DIG E/M SVC 5-10 MIN: CPT | Performed by: NURSE PRACTITIONER

## 2021-05-12 NOTE — PROGRESS NOTES
[address] patients having surgery or anesthesia are required to be Covid tested. You will need to quarantine from the time you are tested until your surgery. PRIOR TO PROCEDURE DATE:        1. PLEASE FOLLOW ANY  GUIDELINES/ INSTRUCTIONS PRIOR TO YOUR PROCEDURE AS ADVISED BY YOUR SURGEON. 2. Arrange for someone to drive you home and be with you for the first 24 hours after discharge for your safety after your procedure for which you received sedation. Ensure it is someone we can share information with regarding your discharge. 3. You must contact your surgeon for instructions IF:   You are taking any blood thinners, aspirin, anti-inflammatory or vitamin E.   There is a change in your physical condition such as a cold, fever, rash, cuts, sores or any other infection, especially near your surgical site. 4. Do not drink alcohol the day before or day of your procedure. 5. A Pre-op History and Physical for surgery MUST be completed by your Physician or Urgent Care within 30 days of your procedure date. Please bring a copy with you on the day of your procedure and along with any other testing performed. THE DAY OF YOUR PROCEDURE:  1. Follow instructions for ARRIVAL TIME as DIRECTED BY YOUR SURGEON. 2. Enter the MAIN entrance from [address] and follow the signs to the free [employer] or [employer] parking (offered free of charge 6am-5pm). 3. Enter the Main Entrance of the hospital (do not enter from the lower level of the parking garage). Upon entrance, check in with the  at the main desk on your left. If no one is available at the desk, proceed into the [facility] Waiting Room and go through the door directly into the [facility]. There is a Check-in desk ACROSS from Room 5 (marked with a sign hanging from the ceiling).  The phone number for the surgery center is [phone]. 4. Please call [phone] option #2 option #2 if you have not been preregistered yet. On the day of your procedure bring your insurance card and photo ID. You will be registered at your bedside once brought back to your room. 5. DO NOT EAT ANYTHING eight hours prior to your arrival for surgery. May have 8 ounces of water 4 hours prior to your arrival for surgery. NOTE: ALL Gastric, Bariatric and Bowel surgery patients MUST follow their surgeon's instructions. 6. MEDICATIONS    Take the following medications with a SMALL sip of water: TIAZAC, INHALERS   Use your usual dose of inhalers the morning of surgery. BRING your rescue inhaler with you to hospital.    Anesthesia does NOT want you to take insulin the morning of surgery. They will control your blood sugar while you are at the hospital. Please contact your ordering physician for instructions regarding your insulin the night before your procedure. If you have an insulin pump, please keep it set on basal rate. 7. Do not swallow water when brushing teeth. No gum, candy, mints or ice chips. Refrain from smoking or at least decrease the amount. 8. Dress in loose, comfortable clothing appropriate for redressing after your procedure. Do not wear jewelry (including body piercings), make-up (especially NO eye make-up), fingernail polish (NO toenail polish if foot/leg surgery), lotion, powders or metal hairclips. 9. Dentures, glasses, or contacts will need to be removed before your procedure. Bring cases for your glasses, contacts, dentures, or hearing aids to protect them while you are in surgery. 10. If you use a CPAP, please bring it with you on the day of your procedure. 11. We recommend that valuable personal  belongings such as cash, cell phones, e-tablets or jewelry, be left at home during your stay. The hospital will not be responsible for valuables that are not secured in the hospital safe.  However, if your insurance requires a co-pay, you may want to bring a method of payment, i.e. Check or credit card, if you wish to pay your co-pay the day of surgery. 12. If you are to stay overnight, you may bring a bag with personal items. Please have any large items you may need brought in by your family after your arrival to your hospital room. 15. If you have a Living Will or Durable Power of , please bring a copy on the day of your procedure. 15. With your permission, one family member may accompany you while you are being prepared for surgery. Once you are ready, additional family members may join you. HOW WE KEEP YOU SAFE and WORK TO PREVENT SURGICAL SITE INFECTIONS:  1. Health care workers should always check your ID bracelet to verify your name and birth date. You will be asked many times to state your name, date of birth, and allergies. 2. Health care workers should always clean their hands with soap or alcohol gel before providing care to you. It is okay to ask anyone if they cleaned their hands before they touch you. 3. You will be actively involved in verifying the type of procedure you are having and ensuring the correct surgical site. This will be confirmed multiple times prior to your procedure. Do NOT stiven your surgery site UNLESS instructed to by your surgeon. 4. Do not shave or wax for 72 hours prior to procedure near your operative site. Shaving with a razor can irritate your skin and make it easier to develop an infection. On the day of your procedure, any hair that needs to be removed near the surgical site will be clipped by a healthcare worker using a special clippers designed to avoid skin irritation. 5. When you are in the operating room, your surgical site will be cleansed with a special soap, and in most cases, you will be given an antibiotic before the surgery begins. What to expect AFTER YOUR PROCEDURE:  1.  Immediately following your procedure, your will be taken to the PACU for the first phase of your recovery. Your nurse will help you recover from any potential side effects of anesthesia, such as extreme drowsiness, changes in your vital signs or breathing patterns. Nausea, headache, muscle aches, or sore throat may also occur after anesthesia. Your nurse will help you manage these potential side effects. 2. For comfort and safety, arrange to have someone at home with you for the first 24 hours after discharge. 3. You and your family will be given written instructions about your diet, activity, dressing care, medications, and return visits. 4. Once at home, should issues with nausea, pain, or bleeding occur, or should you notice any signs of infection, you should call your surgeon. 5. Always clean your hands before and after caring for your wound. Do not let your family touch your surgery site without cleaning their hands. 6. Narcotic pain medications can cause significant constipation. You may want to add a stool softener to your postoperative medication schedule or speak to your surgeon on how best to manage this SIDE EFFECT. SPECIAL INSTRUCTIONS     Thank you for allowing us to care for you. We strive to exceed your expectations in the delivery of care and service provided to you and your family. If you need to contact the [place] [address] staff for any reason, please call us at [phone]    Instructions reviewed with patient during preadmission testing phone interview. April Terry. 5/12/2021 .3:10 PM      ADDITIONAL EDUCATIONAL INFORMATION REVIEWED PER PHONE WITH YOU AND/OR YOUR FAMILY:  Yes Hibiclens® Bathing Instructions   No Antibacterial Soap

## 2021-05-12 NOTE — PROGRESS NOTES
SPOKE WITH TAMICA AT DR BROWNE, AWARE OF A1C BEING 7.3, AND MRSA AND OTHER LABS NOT DONE,  STATES MAY DO DOS.

## 2021-05-13 LAB — SARS-COV-2: NOT DETECTED

## 2021-05-17 ENCOUNTER — ANESTHESIA EVENT (OUTPATIENT)
Dept: OPERATING ROOM | Age: 71
End: 2021-05-17
Payer: MEDICARE

## 2021-05-17 NOTE — ANESTHESIA PRE PROCEDURE
Historical Provider, MD       Current medications:    No current facility-administered medications for this encounter. Current Outpatient Medications   Medication Sig Dispense Refill    MYRBETRIQ 50 MG TB24 TAKE 1 TABLET BY MOUTH DAILY      acetaminophen (TYLENOL) 500 MG tablet Take 1,000 mg by mouth 2 times daily      albuterol sulfate  (90 Base) MCG/ACT inhaler USE 2 INHALATIONS EVERY 6 HOURS AS NEEDED      amLODIPine (NORVASC) 5 MG tablet nightly       aspirin 81 MG chewable tablet 81 mg daily      budesonide-formoterol (SYMBICORT) 160-4.5 MCG/ACT AERO INHALE 2 PUFFS BY MOUTH TWICE DAILY      dilTIAZem (TIAZAC) 180 MG extended release capsule TAKE 1 CAPSULE DAILY      ergocalciferol (ERGOCALCIFEROL) 1.25 MG (53898 UT) capsule TAKE 1 CAPSULE EVERY 30 DAYS      tiotropium (SPIRIVA RESPIMAT) 1.25 MCG/ACT AERS inhaler Inhale 2 sprays into the lungs daily      fluticasone (FLONASE) 50 MCG/ACT nasal spray 1 spray by Nasal route      spironolactone (ALDACTONE) 50 MG tablet TAKE 1 TABLET TWICE A DAY      glipiZIDE (GLUCOTROL XL) 10 MG extended release tablet TAKE 1 TABLET DAILY      metFORMIN (GLUCOPHAGE) 1000 MG tablet TAKE 1 TABLET TWICE A DAY WITH MEALS      losartan (COZAAR) 100 MG tablet TAKE 1 TABLET DAILY         Allergies:     Allergies   Allergen Reactions    Atorvastatin Swelling     Joint pain    Pioglitazone Swelling       Problem List:    Patient Active Problem List   Diagnosis Code    Plantar fasciitis, left M72.2    Tenosynovitis of foot M65.9    Arthritis of right knee M17.11       Past Medical History:        Diagnosis Date    Arthritis     Asthma     Atrial fibrillation ([address]     Cancer ([address]     skin    DDD (degenerative disc disease), cervical     Degenerative disc disease, lumbar     Diabetes mellitus ([address]     Hyperlipidemia     Hypertension     MRSA (methicillin resistant staph aureus) culture positive 02/23/2021    colonization    PONV (postoperative nausea and vomiting)        Past Surgical History:        Procedure Laterality Date    APPENDECTOMY      BREAST LUMPECTOMY      BREAST SURGERY Bilateral     biopsy    CARPAL TUNNEL RELEASE Bilateral     HYSTERECTOMY      JOINT REPLACEMENT Right 2021    KNEE    KNEE ARTHROSCOPY Right     NICK AND BSO      TONSILLECTOMY      TOTAL KNEE ARTHROPLASTY Right 2/23/2021    RIGHT MINIMALLY INVASIVE TOTAL KNEE ARTHROPALSTY performed by Solomon Qiu MD at [address]  History:    Social History     Tobacco Use    Smoking status: Never Smoker    Smokeless tobacco: Never Used   Substance Use Topics    Alcohol use: Yes     Comment: social                                 Counseling given: Not Answered      Vital Signs (Current):   Vitals:    05/12/21 1501   Weight: 260 lb (117.9 kg)   Height: 5' 6.5\" (1.689 m)                                              BP Readings from Last 3 Encounters:   02/23/21 123/78   02/23/21 138/72   11/09/17 133/70       NPO Status:                                                                                 BMI:   Wt Readings from Last 3 Encounters:   02/23/21 265 lb (120.2 kg)   11/09/17 246 lb 0.5 oz (111.6 kg)   10/05/17 246 lb 0.5 oz (111.6 kg)     Body mass index is 41.34 kg/m². CBC: No results found for: WBC, RBC, HGB, HCT, MCV, RDW, PLT    CMP: No results found for: NA, K, CL, CO2, BUN, CREATININE, GFRAA, AGRATIO, LABGLOM, GLUCOSE, PROT, CALCIUM, BILITOT, ALKPHOS, AST, ALT    POC Tests: No results for input(s): POCGLU, POCNA, POCK, POCCL, POCBUN, POCHEMO, POCHCT in the last 72 hours.     Coags: No results found for: PROTIME, INR, APTT    HCG (If Applicable): No results found for: PREGTESTUR, PREGSERUM, HCG, HCGQUANT     ABGs: No results found for: PHART, PO2ART, FSY7AIQ, FSF5JGB, BEART, I8YWPCSV     Type & Screen (If Applicable):  No results found for: LABABO, LABRH    Drug/Infectious Status (If Applicable):  No results found for: HIV, HEPCAB    COVID-19 Screening (If Applicable):   Lab Results   Component Value Date    COVID19 Not Detected 05/12/2021    COVID19 NOT DETECTED 02/17/2021           Anesthesia Evaluation  Patient summary reviewed and Nursing notes reviewed   history of anesthetic complications: PONV. Airway: Mallampati: II  TM distance: >3 FB   Neck ROM: full  Mouth opening: > = 3 FB Dental:          Pulmonary:   (+) asthma:                            Cardiovascular:    (+) hypertension:, dysrhythmias: atrial fibrillation, hyperlipidemia                  Neuro/Psych:   Negative Neuro/Psych ROS              GI/Hepatic/Renal:             Endo/Other:    (+) Diabetes, : arthritis:., .                 Abdominal:           Vascular: negative vascular ROS. Anesthesia Plan      general     ASA 1    (53-year-old female presents for left total knee arthroplasty. Plan epidural anesthesia with I.V. sedation as needed; adductor canal block for postoperative pain control upon request of the attending surgeon. ASA standard monitors. Patient was given the opportunity to ask question and is in agreement with the anesthetic plan.)  Induction: intravenous. Anesthetic plan and risks discussed with patient. Plan discussed with CRNA.     Attending anesthesiologist reviewed and agrees with Pre Eval content        Jackson Garcia MD   5/17/2021

## 2021-05-17 NOTE — PROGRESS NOTES
[facility] / [facility]) [employer], [address]    Acknowledgment of Informed Consent for Surgical or Medical Procedure and Sedation  I agree to allow doctor(s)Jayy Vincent MD  and his/her associates or assistants, including residents and/or other qualified medical practitioner to perform the following medical treatment or procedure and to administer or direct the administration of sedation as necessary:  Procedure(s): MINIMALLY INVASIVE LEFT TOTAL KNEE ARTHROPLASTY   My doctor has explained the following regarding the proposed procedure:   the explanation of the procedure   the benefits of the procedure   the potential problems that might occur during recuperation   the risks and side effects of the procedure which could include but are not limited to severe blood loss, infection, stroke or death   the benefits, risks and side effect of alternative procedures including the consequences of declining this procedure or any alternative procedures   the likelihood of achieving satisfactory results. I acknowledge no guarantee or assurance has been made to me regarding the results. I understand that during the course of this treatment/procedure, unforeseen conditions can occur which require an additional or different procedure. I agree to allow my physician or assistants to perform such extension of the original procedure as they may find necessary. I understand that sedation will often result in temporary impairment of memory and fine motor skills and that sedation can occasionally progress to a state of deep sedation or general anesthesia. I understand the risks of anesthesia for surgery include, but are not limited to, sore throat, hoarseness, injury to face, mouth, or teeth; nausea; headache; injury to blood vessels or nerves; death, brain damage, or paralysis.     I understand that if I have a Limitation of Treatment order in effect during my hospitalization, the order may or may not be in effect during this procedure. I give my doctor permission to give me blood or blood products. I understand that there are risks with receiving blood such as hepatitis, AIDS, fever, or allergic reaction. I acknowledge that the risks, benefits, and alternatives of this treatment have been explained to me and that no express or implied warranty has been given by the hospital, any blood bank, or any person or entity as to the blood or blood components transfused. At the discretion of my doctor, I agree to allow observers, equipment/product representatives and allow photographing, and/or televising of the procedure, provided my name or identity is maintained confidentially. I agree the hospital may dispose of or use for scientific or educational purposes any tissue, fluid, or body parts which may be removed.     ________________________________Date________Time______ am/pm  ([place] [facility])  Patient or Signature of Closest Relative or Legal Guardian    ________________________________Date________Time______am/pm      Page 1 of  1  Witness

## 2021-05-18 ENCOUNTER — APPOINTMENT (OUTPATIENT)
Dept: GENERAL RADIOLOGY | Age: 71
End: 2021-05-18
Attending: ORTHOPAEDIC SURGERY
Payer: MEDICARE

## 2021-05-18 ENCOUNTER — HOSPITAL ENCOUNTER (OUTPATIENT)
Age: 71
Setting detail: OBSERVATION
Discharge: HOME | End: 2021-05-19
Attending: ORTHOPAEDIC SURGERY | Admitting: ORTHOPAEDIC SURGERY
Payer: MEDICARE

## 2021-05-18 ENCOUNTER — ANESTHESIA (OUTPATIENT)
Dept: OPERATING ROOM | Age: 71
End: 2021-05-18
Payer: MEDICARE

## 2021-05-18 VITALS — OXYGEN SATURATION: 95 % | SYSTOLIC BLOOD PRESSURE: 162 MMHG | DIASTOLIC BLOOD PRESSURE: 77 MMHG

## 2021-05-18 DIAGNOSIS — M17.12 ARTHRITIS OF LEFT KNEE: Primary | ICD-10-CM

## 2021-05-18 LAB
ABO/RH: NORMAL
ANTIBODY SCREEN: NORMAL
C-REACTIVE PROTEIN: 5 MG/L (ref 0–5.1)
GLUCOSE BLD-MCNC: 146 MG/DL (ref 70–99)
GLUCOSE BLD-MCNC: 175 MG/DL (ref 70–99)
GLUCOSE BLD-MCNC: 222 MG/DL (ref 70–99)
PERFORMED ON: ABNORMAL
SEDIMENTATION RATE, ERYTHROCYTE: 17 MM/HR (ref 0–30)

## 2021-05-18 PROCEDURE — 87641 MR-STAPH DNA AMP PROBE: CPT

## 2021-05-18 PROCEDURE — 7100000001 HC PACU RECOVERY - ADDTL 15 MIN: Performed by: ORTHOPAEDIC SURGERY

## 2021-05-18 PROCEDURE — 97530 THERAPEUTIC ACTIVITIES: CPT

## 2021-05-18 PROCEDURE — G0378 HOSPITAL OBSERVATION PER HR: HCPCS

## 2021-05-18 PROCEDURE — 86140 C-REACTIVE PROTEIN: CPT

## 2021-05-18 PROCEDURE — 6370000000 HC RX 637 (ALT 250 FOR IP): Performed by: ORTHOPAEDIC SURGERY

## 2021-05-18 PROCEDURE — 94761 N-INVAS EAR/PLS OXIMETRY MLT: CPT

## 2021-05-18 PROCEDURE — 97165 OT EVAL LOW COMPLEX 30 MIN: CPT

## 2021-05-18 PROCEDURE — 97116 GAIT TRAINING THERAPY: CPT

## 2021-05-18 PROCEDURE — 86850 RBC ANTIBODY SCREEN: CPT

## 2021-05-18 PROCEDURE — 2720000010 HC SURG SUPPLY STERILE: Performed by: ORTHOPAEDIC SURGERY

## 2021-05-18 PROCEDURE — 73560 X-RAY EXAM OF KNEE 1 OR 2: CPT

## 2021-05-18 PROCEDURE — 94664 DEMO&/EVAL PT USE INHALER: CPT

## 2021-05-18 PROCEDURE — 6360000002 HC RX W HCPCS: Performed by: ORTHOPAEDIC SURGERY

## 2021-05-18 PROCEDURE — 97162 PT EVAL MOD COMPLEX 30 MIN: CPT

## 2021-05-18 PROCEDURE — 2580000003 HC RX 258: Performed by: NURSE ANESTHETIST, CERTIFIED REGISTERED

## 2021-05-18 PROCEDURE — 6360000002 HC RX W HCPCS: Performed by: ANESTHESIOLOGY

## 2021-05-18 PROCEDURE — 2500000003 HC RX 250 WO HCPCS: Performed by: ORTHOPAEDIC SURGERY

## 2021-05-18 PROCEDURE — 2700000000 HC OXYGEN THERAPY PER DAY

## 2021-05-18 PROCEDURE — 3600000015 HC SURGERY LEVEL 5 ADDTL 15MIN: Performed by: ORTHOPAEDIC SURGERY

## 2021-05-18 PROCEDURE — 97535 SELF CARE MNGMENT TRAINING: CPT

## 2021-05-18 PROCEDURE — 94150 VITAL CAPACITY TEST: CPT

## 2021-05-18 PROCEDURE — 2580000003 HC RX 258: Performed by: ANESTHESIOLOGY

## 2021-05-18 PROCEDURE — 7100000000 HC PACU RECOVERY - FIRST 15 MIN: Performed by: ORTHOPAEDIC SURGERY

## 2021-05-18 PROCEDURE — 86900 BLOOD TYPING SEROLOGIC ABO: CPT

## 2021-05-18 PROCEDURE — 94640 AIRWAY INHALATION TREATMENT: CPT

## 2021-05-18 PROCEDURE — 62327 NJX INTERLAMINAR LMBR/SAC: CPT | Performed by: ANESTHESIOLOGY

## 2021-05-18 PROCEDURE — 85652 RBC SED RATE AUTOMATED: CPT

## 2021-05-18 PROCEDURE — 6360000002 HC RX W HCPCS: Performed by: NURSE ANESTHETIST, CERTIFIED REGISTERED

## 2021-05-18 PROCEDURE — 2580000003 HC RX 258: Performed by: ORTHOPAEDIC SURGERY

## 2021-05-18 PROCEDURE — 86901 BLOOD TYPING SEROLOGIC RH(D): CPT

## 2021-05-18 PROCEDURE — 3600000005 HC SURGERY LEVEL 5 BASE: Performed by: ORTHOPAEDIC SURGERY

## 2021-05-18 PROCEDURE — 3700000001 HC ADD 15 MINUTES (ANESTHESIA): Performed by: ORTHOPAEDIC SURGERY

## 2021-05-18 PROCEDURE — 64447 NJX AA&/STRD FEMORAL NRV IMG: CPT | Performed by: ANESTHESIOLOGY

## 2021-05-18 PROCEDURE — 2709999900 HC NON-CHARGEABLE SUPPLY: Performed by: ORTHOPAEDIC SURGERY

## 2021-05-18 PROCEDURE — C1713 ANCHOR/SCREW BN/BN,TIS/BN: HCPCS | Performed by: ORTHOPAEDIC SURGERY

## 2021-05-18 PROCEDURE — C1776 JOINT DEVICE (IMPLANTABLE): HCPCS | Performed by: ORTHOPAEDIC SURGERY

## 2021-05-18 PROCEDURE — 2500000003 HC RX 250 WO HCPCS: Performed by: NURSE ANESTHETIST, CERTIFIED REGISTERED

## 2021-05-18 PROCEDURE — 3700000000 HC ANESTHESIA ATTENDED CARE: Performed by: ORTHOPAEDIC SURGERY

## 2021-05-18 DEVICE — COMPONENT FEM SZ -E L KNEE ZMLY PMMA PC PRI PRESSFIT CRUCE: Type: IMPLANTABLE DEVICE | Site: KNEE | Status: FUNCTIONAL

## 2021-05-18 DEVICE — NEXGEN PRECOAT STEMMED TIBIAL PLATE SZ 3: Type: IMPLANTABLE DEVICE | Site: KNEE | Status: FUNCTIONAL

## 2021-05-18 DEVICE — CEMENT BNE 20ML 41GM FULL DOSE PMMA W/ TOBRA M VISC RADPQ: Type: IMPLANTABLE DEVICE | Site: KNEE | Status: FUNCTIONAL

## 2021-05-18 DEVICE — NEXGEN ALL-POLY PATELLA 32MM: Type: IMPLANTABLE DEVICE | Site: KNEE | Status: FUNCTIONAL

## 2021-05-18 DEVICE — NEXGEN STRAIGHT STEM EXT 15MM DIA X 75MM(30MM): Type: IMPLANTABLE DEVICE | Site: KNEE | Status: FUNCTIONAL

## 2021-05-18 DEVICE — KNEE K1 TOT HEMI STD CEM IMPL CAPPED K1 ZIM: Type: IMPLANTABLE DEVICE | Site: KNEE | Status: FUNCTIONAL

## 2021-05-18 DEVICE — COMPONENT ARTC SURF CR 3-4 C-H STD 42X66X14 MM TIB KNEE YEL: Type: IMPLANTABLE DEVICE | Site: KNEE | Status: FUNCTIONAL

## 2021-05-18 RX ORDER — ALBUTEROL SULFATE 2.5 MG/3ML
2.5 SOLUTION RESPIRATORY (INHALATION) EVERY 4 HOURS PRN
Status: DISCONTINUED | OUTPATIENT
Start: 2021-05-18 | End: 2021-05-19 | Stop reason: HOSPADM

## 2021-05-18 RX ORDER — SPIRONOLACTONE 50 MG/1
50 TABLET, FILM COATED ORAL 2 TIMES DAILY
Status: DISCONTINUED | OUTPATIENT
Start: 2021-05-18 | End: 2021-05-19 | Stop reason: HOSPADM

## 2021-05-18 RX ORDER — SODIUM CHLORIDE, SODIUM LACTATE, POTASSIUM CHLORIDE, CALCIUM CHLORIDE 600; 310; 30; 20 MG/100ML; MG/100ML; MG/100ML; MG/100ML
INJECTION, SOLUTION INTRAVENOUS CONTINUOUS PRN
Status: DISCONTINUED | OUTPATIENT
Start: 2021-05-18 | End: 2021-05-18 | Stop reason: SDUPTHER

## 2021-05-18 RX ORDER — VANCOMYCIN HYDROCHLORIDE 1 G/20ML
INJECTION, POWDER, LYOPHILIZED, FOR SOLUTION INTRAVENOUS PRN
Status: DISCONTINUED | OUTPATIENT
Start: 2021-05-18 | End: 2021-05-18 | Stop reason: SDUPTHER

## 2021-05-18 RX ORDER — SODIUM CHLORIDE 0.9 % (FLUSH) 0.9 %
5-40 SYRINGE (ML) INJECTION EVERY 12 HOURS SCHEDULED
Status: DISCONTINUED | OUTPATIENT
Start: 2021-05-18 | End: 2021-05-19 | Stop reason: HOSPADM

## 2021-05-18 RX ORDER — OXYCODONE HCL 10 MG/1
10 TABLET, FILM COATED, EXTENDED RELEASE ORAL ONCE
Status: COMPLETED | OUTPATIENT
Start: 2021-05-18 | End: 2021-05-18

## 2021-05-18 RX ORDER — MORPHINE SULFATE 4 MG/ML
1 INJECTION, SOLUTION INTRAMUSCULAR; INTRAVENOUS EVERY 5 MIN PRN
Status: DISCONTINUED | OUTPATIENT
Start: 2021-05-18 | End: 2021-05-18 | Stop reason: HOSPADM

## 2021-05-18 RX ORDER — SODIUM CHLORIDE 0.9 % (FLUSH) 0.9 %
10 SYRINGE (ML) INJECTION PRN
Status: DISCONTINUED | OUTPATIENT
Start: 2021-05-18 | End: 2021-05-18 | Stop reason: HOSPADM

## 2021-05-18 RX ORDER — CEFAZOLIN SODIUM 2 G/50ML
2000 SOLUTION INTRAVENOUS ONCE
Status: COMPLETED | OUTPATIENT
Start: 2021-05-18 | End: 2021-05-18

## 2021-05-18 RX ORDER — OXYCODONE HYDROCHLORIDE 5 MG/1
5 TABLET ORAL EVERY 6 HOURS PRN
Qty: 28 TABLET | Refills: 0 | Status: SHIPPED | OUTPATIENT
Start: 2021-05-18 | End: 2021-05-25

## 2021-05-18 RX ORDER — NICOTINE POLACRILEX 4 MG
15 LOZENGE BUCCAL PRN
Status: DISCONTINUED | OUTPATIENT
Start: 2021-05-18 | End: 2021-05-19 | Stop reason: HOSPADM

## 2021-05-18 RX ORDER — BUDESONIDE AND FORMOTEROL FUMARATE DIHYDRATE 160; 4.5 UG/1; UG/1
2 AEROSOL RESPIRATORY (INHALATION) 2 TIMES DAILY
Status: DISCONTINUED | OUTPATIENT
Start: 2021-05-18 | End: 2021-05-18

## 2021-05-18 RX ORDER — AMLODIPINE BESYLATE 5 MG/1
5 TABLET ORAL NIGHTLY
Status: DISCONTINUED | OUTPATIENT
Start: 2021-05-18 | End: 2021-05-19 | Stop reason: HOSPADM

## 2021-05-18 RX ORDER — BUDESONIDE 0.5 MG/2ML
0.5 INHALANT ORAL 2 TIMES DAILY
Status: DISCONTINUED | OUTPATIENT
Start: 2021-05-18 | End: 2021-05-19 | Stop reason: HOSPADM

## 2021-05-18 RX ORDER — 0.9 % SODIUM CHLORIDE 0.9 %
INTRAVENOUS SOLUTION INTRAVENOUS CONTINUOUS PRN
Status: COMPLETED | OUTPATIENT
Start: 2021-05-18 | End: 2021-05-18

## 2021-05-18 RX ORDER — GLIPIZIDE 5 MG/1
10 TABLET ORAL
Status: DISCONTINUED | OUTPATIENT
Start: 2021-05-19 | End: 2021-05-19 | Stop reason: HOSPADM

## 2021-05-18 RX ORDER — TROSPIUM CHLORIDE 20 MG/1
20 TABLET, FILM COATED ORAL
Status: DISCONTINUED | OUTPATIENT
Start: 2021-05-18 | End: 2021-05-19 | Stop reason: HOSPADM

## 2021-05-18 RX ORDER — MAGNESIUM HYDROXIDE 1200 MG/15ML
LIQUID ORAL CONTINUOUS PRN
Status: COMPLETED | OUTPATIENT
Start: 2021-05-18 | End: 2021-05-18

## 2021-05-18 RX ORDER — ACETAMINOPHEN 500 MG
1000 TABLET ORAL EVERY 8 HOURS SCHEDULED
Status: DISCONTINUED | OUTPATIENT
Start: 2021-05-18 | End: 2021-05-19 | Stop reason: HOSPADM

## 2021-05-18 RX ORDER — OXYCODONE HYDROCHLORIDE 5 MG/1
10 TABLET ORAL PRN
Status: DISCONTINUED | OUTPATIENT
Start: 2021-05-18 | End: 2021-05-18 | Stop reason: HOSPADM

## 2021-05-18 RX ORDER — ONDANSETRON 2 MG/ML
INJECTION INTRAMUSCULAR; INTRAVENOUS PRN
Status: DISCONTINUED | OUTPATIENT
Start: 2021-05-18 | End: 2021-05-18 | Stop reason: SDUPTHER

## 2021-05-18 RX ORDER — ONDANSETRON 2 MG/ML
4 INJECTION INTRAMUSCULAR; INTRAVENOUS EVERY 6 HOURS PRN
Status: DISCONTINUED | OUTPATIENT
Start: 2021-05-18 | End: 2021-05-19 | Stop reason: HOSPADM

## 2021-05-18 RX ORDER — LOSARTAN POTASSIUM 100 MG/1
100 TABLET ORAL DAILY
Status: DISCONTINUED | OUTPATIENT
Start: 2021-05-18 | End: 2021-05-19 | Stop reason: HOSPADM

## 2021-05-18 RX ORDER — FENTANYL CITRATE 50 UG/ML
100 INJECTION, SOLUTION INTRAMUSCULAR; INTRAVENOUS ONCE
Status: DISCONTINUED | OUTPATIENT
Start: 2021-05-18 | End: 2021-05-19 | Stop reason: HOSPADM

## 2021-05-18 RX ORDER — SODIUM CHLORIDE 9 MG/ML
25 INJECTION, SOLUTION INTRAVENOUS PRN
Status: DISCONTINUED | OUTPATIENT
Start: 2021-05-18 | End: 2021-05-18 | Stop reason: HOSPADM

## 2021-05-18 RX ORDER — SODIUM CHLORIDE 9 MG/ML
INJECTION, SOLUTION INTRAVENOUS CONTINUOUS
Status: DISCONTINUED | OUTPATIENT
Start: 2021-05-18 | End: 2021-05-19 | Stop reason: HOSPADM

## 2021-05-18 RX ORDER — SODIUM CHLORIDE, SODIUM LACTATE, POTASSIUM CHLORIDE, CALCIUM CHLORIDE 600; 310; 30; 20 MG/100ML; MG/100ML; MG/100ML; MG/100ML
INJECTION, SOLUTION INTRAVENOUS CONTINUOUS
Status: DISCONTINUED | OUTPATIENT
Start: 2021-05-18 | End: 2021-05-18

## 2021-05-18 RX ORDER — FENTANYL CITRATE 50 UG/ML
INJECTION, SOLUTION INTRAMUSCULAR; INTRAVENOUS PRN
Status: DISCONTINUED | OUTPATIENT
Start: 2021-05-18 | End: 2021-05-18 | Stop reason: SDUPTHER

## 2021-05-18 RX ORDER — SENNA AND DOCUSATE SODIUM 50; 8.6 MG/1; MG/1
1 TABLET, FILM COATED ORAL 2 TIMES DAILY
Status: DISCONTINUED | OUTPATIENT
Start: 2021-05-18 | End: 2021-05-19 | Stop reason: HOSPADM

## 2021-05-18 RX ORDER — OXYCODONE HYDROCHLORIDE 5 MG/1
10 TABLET ORAL EVERY 4 HOURS PRN
Status: DISCONTINUED | OUTPATIENT
Start: 2021-05-18 | End: 2021-05-19 | Stop reason: HOSPADM

## 2021-05-18 RX ORDER — HYDRALAZINE HYDROCHLORIDE 20 MG/ML
5 INJECTION INTRAMUSCULAR; INTRAVENOUS EVERY 10 MIN PRN
Status: DISCONTINUED | OUTPATIENT
Start: 2021-05-18 | End: 2021-05-18 | Stop reason: HOSPADM

## 2021-05-18 RX ORDER — DEXTROSE MONOHYDRATE 50 MG/ML
100 INJECTION, SOLUTION INTRAVENOUS PRN
Status: DISCONTINUED | OUTPATIENT
Start: 2021-05-18 | End: 2021-05-19 | Stop reason: HOSPADM

## 2021-05-18 RX ORDER — ROPIVACAINE HYDROCHLORIDE 5 MG/ML
30 INJECTION, SOLUTION EPIDURAL; INFILTRATION; PERINEURAL ONCE
Status: DISCONTINUED | OUTPATIENT
Start: 2021-05-18 | End: 2021-05-19 | Stop reason: HOSPADM

## 2021-05-18 RX ORDER — SODIUM CHLORIDE 0.9 % (FLUSH) 0.9 %
10 SYRINGE (ML) INJECTION EVERY 12 HOURS SCHEDULED
Status: DISCONTINUED | OUTPATIENT
Start: 2021-05-18 | End: 2021-05-18 | Stop reason: HOSPADM

## 2021-05-18 RX ORDER — BUPIVACAINE HYDROCHLORIDE 2.5 MG/ML
INJECTION, SOLUTION EPIDURAL; INFILTRATION; INTRACAUDAL PRN
Status: DISCONTINUED | OUTPATIENT
Start: 2021-05-18 | End: 2021-05-18 | Stop reason: HOSPADM

## 2021-05-18 RX ORDER — DEXAMETHASONE SODIUM PHOSPHATE 10 MG/ML
10 INJECTION, SOLUTION INTRAMUSCULAR; INTRAVENOUS ONCE
Status: COMPLETED | OUTPATIENT
Start: 2021-05-18 | End: 2021-05-18

## 2021-05-18 RX ORDER — MIDAZOLAM HYDROCHLORIDE 1 MG/ML
2 INJECTION INTRAMUSCULAR; INTRAVENOUS ONCE
Status: DISCONTINUED | OUTPATIENT
Start: 2021-05-18 | End: 2021-05-19 | Stop reason: HOSPADM

## 2021-05-18 RX ORDER — ARFORMOTEROL TARTRATE 15 UG/2ML
15 SOLUTION RESPIRATORY (INHALATION) 2 TIMES DAILY
Status: DISCONTINUED | OUTPATIENT
Start: 2021-05-18 | End: 2021-05-19 | Stop reason: HOSPADM

## 2021-05-18 RX ORDER — OXYCODONE HYDROCHLORIDE 5 MG/1
5 TABLET ORAL PRN
Status: DISCONTINUED | OUTPATIENT
Start: 2021-05-18 | End: 2021-05-18 | Stop reason: HOSPADM

## 2021-05-18 RX ORDER — PROMETHAZINE HYDROCHLORIDE 25 MG/ML
6.25 INJECTION, SOLUTION INTRAMUSCULAR; INTRAVENOUS
Status: DISCONTINUED | OUTPATIENT
Start: 2021-05-18 | End: 2021-05-18 | Stop reason: HOSPADM

## 2021-05-18 RX ORDER — SODIUM CHLORIDE 9 MG/ML
25 INJECTION, SOLUTION INTRAVENOUS PRN
Status: DISCONTINUED | OUTPATIENT
Start: 2021-05-18 | End: 2021-05-19 | Stop reason: HOSPADM

## 2021-05-18 RX ORDER — ROPIVACAINE HYDROCHLORIDE 5 MG/ML
INJECTION, SOLUTION EPIDURAL; INFILTRATION; PERINEURAL
Status: COMPLETED | OUTPATIENT
Start: 2021-05-18 | End: 2021-05-18

## 2021-05-18 RX ORDER — FLUTICASONE PROPIONATE 50 MCG
1 SPRAY, SUSPENSION (ML) NASAL DAILY
Status: DISCONTINUED | OUTPATIENT
Start: 2021-05-19 | End: 2021-05-19 | Stop reason: HOSPADM

## 2021-05-18 RX ORDER — CEFAZOLIN SODIUM 2 G/50ML
2000 SOLUTION INTRAVENOUS EVERY 8 HOURS
Status: COMPLETED | OUTPATIENT
Start: 2021-05-18 | End: 2021-05-19

## 2021-05-18 RX ORDER — OXYCODONE HYDROCHLORIDE 5 MG/1
5 TABLET ORAL EVERY 4 HOURS PRN
Status: DISCONTINUED | OUTPATIENT
Start: 2021-05-18 | End: 2021-05-19 | Stop reason: HOSPADM

## 2021-05-18 RX ORDER — ASPIRIN 81 MG/1
81 TABLET ORAL DAILY
Status: DISCONTINUED | OUTPATIENT
Start: 2021-05-19 | End: 2021-05-19 | Stop reason: HOSPADM

## 2021-05-18 RX ORDER — KETOROLAC TROMETHAMINE 30 MG/ML
15 INJECTION, SOLUTION INTRAMUSCULAR; INTRAVENOUS EVERY 6 HOURS
Status: DISCONTINUED | OUTPATIENT
Start: 2021-05-18 | End: 2021-05-19 | Stop reason: HOSPADM

## 2021-05-18 RX ORDER — KETOROLAC TROMETHAMINE 15 MG/ML
15 INJECTION, SOLUTION INTRAMUSCULAR; INTRAVENOUS ONCE
Status: COMPLETED | OUTPATIENT
Start: 2021-05-18 | End: 2021-05-18

## 2021-05-18 RX ORDER — METOCLOPRAMIDE HYDROCHLORIDE 5 MG/ML
10 INJECTION INTRAMUSCULAR; INTRAVENOUS
Status: COMPLETED | OUTPATIENT
Start: 2021-05-18 | End: 2021-05-18

## 2021-05-18 RX ORDER — ACETAMINOPHEN 500 MG
1000 TABLET ORAL ONCE
Status: COMPLETED | OUTPATIENT
Start: 2021-05-18 | End: 2021-05-18

## 2021-05-18 RX ORDER — DIPHENHYDRAMINE HYDROCHLORIDE 50 MG/ML
12.5 INJECTION INTRAMUSCULAR; INTRAVENOUS
Status: DISCONTINUED | OUTPATIENT
Start: 2021-05-18 | End: 2021-05-18 | Stop reason: HOSPADM

## 2021-05-18 RX ORDER — PROPOFOL 10 MG/ML
INJECTION, EMULSION INTRAVENOUS CONTINUOUS PRN
Status: DISCONTINUED | OUTPATIENT
Start: 2021-05-18 | End: 2021-05-18 | Stop reason: SDUPTHER

## 2021-05-18 RX ORDER — DEXTROSE MONOHYDRATE 25 G/50ML
12.5 INJECTION, SOLUTION INTRAVENOUS PRN
Status: DISCONTINUED | OUTPATIENT
Start: 2021-05-18 | End: 2021-05-19 | Stop reason: HOSPADM

## 2021-05-18 RX ORDER — ALBUTEROL SULFATE 2.5 MG/3ML
2.5 SOLUTION RESPIRATORY (INHALATION)
Status: COMPLETED | OUTPATIENT
Start: 2021-05-18 | End: 2021-05-18

## 2021-05-18 RX ORDER — LABETALOL HYDROCHLORIDE 5 MG/ML
5 INJECTION, SOLUTION INTRAVENOUS EVERY 10 MIN PRN
Status: DISCONTINUED | OUTPATIENT
Start: 2021-05-18 | End: 2021-05-18 | Stop reason: HOSPADM

## 2021-05-18 RX ORDER — METHOCARBAMOL 500 MG/1
500 TABLET, FILM COATED ORAL 4 TIMES DAILY
Status: DISCONTINUED | OUTPATIENT
Start: 2021-05-18 | End: 2021-05-19 | Stop reason: HOSPADM

## 2021-05-18 RX ORDER — MIDAZOLAM HYDROCHLORIDE 1 MG/ML
INJECTION INTRAMUSCULAR; INTRAVENOUS PRN
Status: DISCONTINUED | OUTPATIENT
Start: 2021-05-18 | End: 2021-05-18 | Stop reason: SDUPTHER

## 2021-05-18 RX ORDER — LIDOCAINE HCL/PF 100 MG/5ML
SYRINGE (ML) INJECTION PRN
Status: DISCONTINUED | OUTPATIENT
Start: 2021-05-18 | End: 2021-05-18 | Stop reason: SDUPTHER

## 2021-05-18 RX ORDER — SODIUM CHLORIDE 0.9 % (FLUSH) 0.9 %
5-40 SYRINGE (ML) INJECTION PRN
Status: DISCONTINUED | OUTPATIENT
Start: 2021-05-18 | End: 2021-05-19 | Stop reason: HOSPADM

## 2021-05-18 RX ORDER — SODIUM CHLORIDE 9 MG/ML
INJECTION, SOLUTION INTRAVENOUS CONTINUOUS
Status: DISCONTINUED | OUTPATIENT
Start: 2021-05-18 | End: 2021-05-18

## 2021-05-18 RX ORDER — DILTIAZEM HYDROCHLORIDE 180 MG/1
180 CAPSULE, COATED, EXTENDED RELEASE ORAL DAILY
Status: DISCONTINUED | OUTPATIENT
Start: 2021-05-19 | End: 2021-05-19 | Stop reason: HOSPADM

## 2021-05-18 RX ORDER — MEPERIDINE HYDROCHLORIDE 25 MG/ML
12.5 INJECTION INTRAMUSCULAR; INTRAVENOUS; SUBCUTANEOUS EVERY 5 MIN PRN
Status: DISCONTINUED | OUTPATIENT
Start: 2021-05-18 | End: 2021-05-18 | Stop reason: HOSPADM

## 2021-05-18 RX ADMIN — Medication 2 MG: at 08:36

## 2021-05-18 RX ADMIN — HYDROMORPHONE HYDROCHLORIDE 0.5 MG: 1 INJECTION, SOLUTION INTRAMUSCULAR; INTRAVENOUS; SUBCUTANEOUS at 09:43

## 2021-05-18 RX ADMIN — KETOROLAC TROMETHAMINE 15 MG: 30 INJECTION, SOLUTION INTRAMUSCULAR; INTRAVENOUS at 20:48

## 2021-05-18 RX ADMIN — METHOCARBAMOL TABLETS 500 MG: 500 TABLET, COATED ORAL at 16:14

## 2021-05-18 RX ADMIN — SODIUM CHLORIDE, SODIUM LACTATE, POTASSIUM CHLORIDE, AND CALCIUM CHLORIDE: .6; .31; .03; .02 INJECTION, SOLUTION INTRAVENOUS at 06:51

## 2021-05-18 RX ADMIN — KETOROLAC TROMETHAMINE 15 MG: 15 INJECTION, SOLUTION INTRAMUSCULAR; INTRAVENOUS at 10:00

## 2021-05-18 RX ADMIN — VANCOMYCIN HYDROCHLORIDE 1750 MG: 10 INJECTION, POWDER, LYOPHILIZED, FOR SOLUTION INTRAVENOUS at 06:54

## 2021-05-18 RX ADMIN — FENTANYL CITRATE 100 MCG: 50 INJECTION, SOLUTION INTRAMUSCULAR; INTRAVENOUS at 06:53

## 2021-05-18 RX ADMIN — METFORMIN HYDROCHLORIDE 500 MG: 500 TABLET ORAL at 16:14

## 2021-05-18 RX ADMIN — METOCLOPRAMIDE HYDROCHLORIDE 10 MG: 5 INJECTION INTRAMUSCULAR; INTRAVENOUS at 10:32

## 2021-05-18 RX ADMIN — SPIRONOLACTONE 50 MG: 50 TABLET ORAL at 20:48

## 2021-05-18 RX ADMIN — HYDROMORPHONE HYDROCHLORIDE 0.5 MG: 1 INJECTION, SOLUTION INTRAMUSCULAR; INTRAVENOUS; SUBCUTANEOUS at 09:34

## 2021-05-18 RX ADMIN — CEFAZOLIN SODIUM 2000 MG: 2 SOLUTION INTRAVENOUS at 07:32

## 2021-05-18 RX ADMIN — TROSPIUM CHLORIDE 20 MG: 20 TABLET, FILM COATED ORAL at 16:14

## 2021-05-18 RX ADMIN — Medication 10 ML: at 20:49

## 2021-05-18 RX ADMIN — METHOCARBAMOL TABLETS 500 MG: 500 TABLET, COATED ORAL at 20:48

## 2021-05-18 RX ADMIN — Medication 5 MG: at 07:28

## 2021-05-18 RX ADMIN — SODIUM CHLORIDE, SODIUM LACTATE, POTASSIUM CHLORIDE, AND CALCIUM CHLORIDE: .6; .31; .03; .02 INJECTION, SOLUTION INTRAVENOUS at 07:32

## 2021-05-18 RX ADMIN — ALBUTEROL SULFATE 2.5 MG: 2.5 SOLUTION RESPIRATORY (INHALATION) at 12:21

## 2021-05-18 RX ADMIN — PROPOFOL 100 MCG/KG/MIN: 10 INJECTION, EMULSION INTRAVENOUS at 07:31

## 2021-05-18 RX ADMIN — OXYCODONE HYDROCHLORIDE 10 MG: 10 TABLET, FILM COATED, EXTENDED RELEASE ORAL at 06:19

## 2021-05-18 RX ADMIN — Medication 3 MG: at 09:16

## 2021-05-18 RX ADMIN — KETOROLAC TROMETHAMINE 15 MG: 30 INJECTION, SOLUTION INTRAMUSCULAR; INTRAVENOUS at 16:14

## 2021-05-18 RX ADMIN — ACETAMINOPHEN 1000 MG: 500 TABLET ORAL at 06:19

## 2021-05-18 RX ADMIN — TRANEXAMIC ACID 1000 MG: 100 INJECTION, SOLUTION INTRAVENOUS at 07:32

## 2021-05-18 RX ADMIN — ONDANSETRON 4 MG: 2 INJECTION INTRAMUSCULAR; INTRAVENOUS at 07:32

## 2021-05-18 RX ADMIN — HYDROMORPHONE HYDROCHLORIDE 0.25 MG: 1 INJECTION, SOLUTION INTRAMUSCULAR; INTRAVENOUS; SUBCUTANEOUS at 10:34

## 2021-05-18 RX ADMIN — MIDAZOLAM HYDROCHLORIDE 2 MG: 2 INJECTION, SOLUTION INTRAMUSCULAR; INTRAVENOUS at 06:53

## 2021-05-18 RX ADMIN — Medication 5 MG: at 07:23

## 2021-05-18 RX ADMIN — LOSARTAN POTASSIUM 100 MG: 100 TABLET, FILM COATED ORAL at 16:38

## 2021-05-18 RX ADMIN — VANCOMYCIN HYDROCHLORIDE 1750 MG: 1 INJECTION, POWDER, LYOPHILIZED, FOR SOLUTION INTRAVENOUS at 07:36

## 2021-05-18 RX ADMIN — SODIUM CHLORIDE, POTASSIUM CHLORIDE, SODIUM LACTATE AND CALCIUM CHLORIDE: 600; 310; 30; 20 INJECTION, SOLUTION INTRAVENOUS at 06:36

## 2021-05-18 RX ADMIN — DEXAMETHASONE SODIUM PHOSPHATE 10 MG: 10 INJECTION, SOLUTION INTRAMUSCULAR; INTRAVENOUS at 06:34

## 2021-05-18 RX ADMIN — ACETAMINOPHEN 1000 MG: 500 TABLET ORAL at 20:48

## 2021-05-18 RX ADMIN — CEFAZOLIN SODIUM 2000 MG: 2 SOLUTION INTRAVENOUS at 16:38

## 2021-05-18 RX ADMIN — ROPIVACAINE HYDROCHLORIDE 30 ML: 5 INJECTION, SOLUTION EPIDURAL; INFILTRATION; PERINEURAL at 06:56

## 2021-05-18 RX ADMIN — OXYCODONE 5 MG: 5 TABLET ORAL at 22:43

## 2021-05-18 RX ADMIN — HYDROMORPHONE HYDROCHLORIDE 0.5 MG: 1 INJECTION, SOLUTION INTRAMUSCULAR; INTRAVENOUS; SUBCUTANEOUS at 09:54

## 2021-05-18 RX ADMIN — ACETAMINOPHEN 1000 MG: 500 TABLET ORAL at 16:13

## 2021-05-18 ASSESSMENT — PAIN SCALES - GENERAL
PAINLEVEL_OUTOF10: 6
PAINLEVEL_OUTOF10: 10
PAINLEVEL_OUTOF10: 7
PAINLEVEL_OUTOF10: 3
PAINLEVEL_OUTOF10: 7
PAINLEVEL_OUTOF10: 3
PAINLEVEL_OUTOF10: 1
PAINLEVEL_OUTOF10: 10

## 2021-05-18 ASSESSMENT — PULMONARY FUNCTION TESTS
PIF_VALUE: 1
PIF_VALUE: 0
PIF_VALUE: 0
PIF_VALUE: 1
PIF_VALUE: 1
PIF_VALUE: 0
PIF_VALUE: 1
PIF_VALUE: 0
PIF_VALUE: 1
PIF_VALUE: 0
PIF_VALUE: 1

## 2021-05-18 ASSESSMENT — PAIN DESCRIPTION - DESCRIPTORS
DESCRIPTORS: ACHING
DESCRIPTORS: ACHING;BURNING
DESCRIPTORS: ACHING
DESCRIPTORS: ACHING

## 2021-05-18 ASSESSMENT — PAIN DESCRIPTION - PROGRESSION
CLINICAL_PROGRESSION: GRADUALLY IMPROVING
CLINICAL_PROGRESSION: NOT CHANGED
CLINICAL_PROGRESSION: NOT CHANGED

## 2021-05-18 ASSESSMENT — PAIN DESCRIPTION - ORIENTATION: ORIENTATION: LEFT

## 2021-05-18 ASSESSMENT — PAIN - FUNCTIONAL ASSESSMENT
PAIN_FUNCTIONAL_ASSESSMENT: 0-10
PAIN_FUNCTIONAL_ASSESSMENT: ACTIVITIES ARE NOT PREVENTED

## 2021-05-18 ASSESSMENT — PAIN DESCRIPTION - PAIN TYPE
TYPE: ACUTE PAIN
TYPE: ACUTE PAIN

## 2021-05-18 ASSESSMENT — PAIN DESCRIPTION - FREQUENCY
FREQUENCY: CONTINUOUS

## 2021-05-18 ASSESSMENT — PAIN DESCRIPTION - LOCATION
LOCATION: KNEE

## 2021-05-18 ASSESSMENT — PAIN DESCRIPTION - ONSET: ONSET: ON-GOING

## 2021-05-18 NOTE — PROGRESS NOTES
Occupational Therapy   Occupational Therapy Initial Assessment/Tx  Date: 2021   Patient Name: Whitney Pires  MRN: [id number]     : 1950    Date of Service: 2021    Discharge Recommendations: Whitney Pires scored a 20/24 on the AM-PAC ADL Inpatient form. Current research shows that an AM-PAC score of 18 or greater is typically associated with a discharge to the patient's home setting. Based on the patient's AM-PAC score, and their current ADL deficits, it is recommended that the patient have 2-3 sessions per week of Occupational Therapy at d/c to increase the patient's independence. At this time, this patient demonstrates the endurance and safety to discharge home with home services and a follow up treatment frequency of 2-3x/wk. Please see assessment section for further patient specific details. If patient discharges prior to next session this note will serve as a discharge summary. Please see below for the latest assessment towards goals. OT Equipment Recommendations  Equipment Needed: No  Other: has recommended equipment    Assessment   Performance deficits / Impairments: Decreased functional mobility ; Decreased ADL status; Decreased endurance  Assessment: Pt is POD #0 following L TKA and is now below her typical independent baseline. Pt required min-mod A for functional ambulation with rollator this date with several instances of L knee buckling and requiring seated rest breaks. Pt also required A for ADLS and had spO2 destat to 83% while on RA during activity. Pt would benefit from inpatient stay to maximize functional independence and safety prior to discharge home. Will cont to follow on acute OT POC. [facility] indicates homebound discharge. If home, pt would benefit from initial 24hr supervision (has from ) and possible HHOT pending progression while in house.   Treatment Diagnosis: decreased independence with ADLs and fx mobility  Prognosis: Good  Decision Making: Low Complexity  OT Education: OT Role;Plan of Care;ADL Adaptive Strategies;Transfer Training;Precautions; Equipment  Patient Education: pt verb understanding  REQUIRES OT FOLLOW UP: Yes  Activity Tolerance  Activity Tolerance: Patient limited by pain; Patient limited by fatigue  Activity Tolerance: Pt with spO2 dropping to low 80's with ambulation (82-83% on RA), pt able to recover to low 90's with seated rest break and VC for pursed lip breathing  Safety Devices  Safety Devices in place: Yes  Type of devices: All fall risk precautions in place;Gait belt;Patient at risk for falls; Left in bed;Nurse notified           Patient Diagnosis(es): The encounter diagnosis was Arthritis of left knee. has a past medical history of Arthritis, Asthma, Atrial fibrillation ([address] Cancer ([address] DDD (degenerative disc disease), cervical, Degenerative disc disease, lumbar, Diabetes mellitus ([address] Hyperlipidemia, Hypertension, MRSA (methicillin resistant staph aureus) culture positive, and PONV (postoperative nausea and vomiting). has a past surgical history that includes Appendectomy; Knee arthroscopy (Right); Breast surgery (Bilateral); Breast lumpectomy; Carpal tunnel release (Bilateral); Hysterectomy; Tonsillectomy; selena and bso (cervix removed); Total knee arthroplasty (Right, 2/23/2021); and joint replacement (Right, 2021). Treatment Diagnosis: decreased independence with ADLs and fx mobility      Restrictions  Position Activity Restriction  Other position/activity restrictions: Activity day of surgery: 1)  Dangle at edge of bed, progress to stand, and take a few steps with assistive device. 2)  Encourage ankle pumps and quad sets. 3)  Up in bedside chair as tolerated. 4)  Commode privileges with assistance.   5)  Advance to ambulation in hallway on day of surgery as tolerated; WBAT    Subjective   General  Chart Reviewed: Yes  Patient assessed for rehabilitation services?: Yes  Additional replaced in february       Objective   Vision: Impaired  Vision Exceptions: Wears glasses for reading  Hearing: Within functional limits    Orientation  Overall Orientation Status: Within Functional Limits  Observation/Palpation  Observation: L knee wrapped in ace wraps  Balance  Sitting Balance: Contact guard assistance (CGA with times of SBA)  Standing Balance: Minimal assistance  Standing Balance  Time: up to 7 min  Activity: to/from bathroom, walk on unit  Comment: use of 2WW, min A to ensure safety  Functional Mobility  Functional - Mobility Device: Rolling Walker  Activity: To/from bathroom; Other  Assist Level: Moderate assistance  Functional Mobility Comments: use of 2WW; min A x 1 progressing to times of mod A x 1 2/2 two instances of L knee buckling , slow and effortful  Toilet Transfers  Toilet - Technique: Ambulating  Equipment Used: Standard toilet  Toilet Transfer: Minimal assistance  Toilet Transfers Comments: use of grab bar on L side; VC for hand placement  ADL  Feeding: Independent; Beverage management  Grooming: Contact guard assistance (to wash hands at sink)  UE Dressing: Minimal assistance (min A to fix gown)  LE Dressing: Maximum assistance (max A to don socks)  Toileting: Minimal assistance (pt able to perform kulwant care from seated position- min A for brief mngmt)  Tone RUE  RUE Tone: Normotonic  Tone LUE  LUE Tone: Normotonic  Coordination  Movements Are Fluid And Coordinated: Yes        Transfers  Sit to stand: Minimal assistance  Stand to sit: Minimal assistance  Transfer Comments: min A with VC for hand placement ; use of 2WW once standing     Cognition  Overall Cognitive Status: Exceptions  Arousal/Alertness: Delayed responses to stimuli  Following Commands:  Follows one step commands consistently        Sensation  Overall [facility]        LUE AROM (degrees)  LUE AROM : WFL  Left Hand AROM (degrees)  Left Hand AROM: WFL  RUE AROM (degrees)  RUE AROM : WFL  Right Hand AROM (degrees)  Right Hand AROM: WFL  LUE Strength  Gross LUE Strength: WFL  RUE Strength  Gross RUE Strength: WFL           Treatment included functional transfer training, ADLs, and patient education.            Plan   Plan  Times per week: 5-7  Current Treatment Recommendations: Strengthening, Endurance Training, Self-Care / ADL, Balance Training, Functional Mobility Training, Safety Education & Training    G-Code     OutComes Score                                                  AM-PAC Score        AM-PAC Inpatient Daily Activity Raw Score: 20 (05/18/21 1626)  AM-PAC Inpatient ADL T-Scale Score : 42.03 (05/18/21 1626)  ADL Inpatient CMS 0-100% Score: 38.32 (05/18/21 1626)  ADL Inpatient CMS G-Code Modifier : Yvan Randall (05/18/21 1626)    Goals  Short term goals  Time Frame for Short term goals: by discharge  Short term goal 1: Pt will perform ambulatory toilet transfer with SBA- not met  Short term goal 2: Pt will perform UB/LB dressing routine with SBA and AE as needed- not met  Patient Goals   Patient goals : return home       Therapy Time   Individual Concurrent Group Co-treatment   Time In 1320         Time Out 1359         Minutes 39         Timed Code Treatment Minutes: 24 Minutes (+ 15 min OT eval)       Lisa Garza OT

## 2021-05-18 NOTE — OP NOTE
PREOPERATIVE DIAGNOSIS: Left knee tricompartmental arthrosis. POSTOPERATIVE DIAGNOSIS: Left knee tricompartmental arthrosis. PROCEDURE(S) PERFORMED: Minimally Invasive left total knee arthroplasty, cemented,  cruciate-retaining. SURGEON: Mele Wade M.D. FIRST SURGICAL ASSISTANT:  Joan Mcgowan, [facility]. The Physician Assistant was necessary to perform the surgery today by assisting with application of implants and manipulation of the extremity. This help was not otherwise available in the operating room today. PRE-OPERATIVE ANTIBIOTICS:  Cefazolin 2G + 1G Vancomycin    ANESTHESIA:  Epidural + Adductor Canal Block + 40 cc of 0.25% Bupivacaine Periarticular injection    ESTIMATED BLOOD LOSS: 50 cc     INTRAVENOUS FLUIDS: 900 cc    URINE OUTPUT: 0 cc    TOURNIQUET TIME: 60 minutes at 972 mm Hg    COMPLICATIONS: None. SPECIMENS: None. IMPLANTS:   Jh NexGen CR Flex, size Left E minus GSF femur  Jh NexGen cemented tibia Standard Keel with 15 x 30 mm stem extension, size 3  All-polyethylene patella, size 32   Cruciate-retaining highly-crosslinked polyethylene insert, size 14 mm     OPERATIVE INDICATIONS: This patient had longstanding knee pain. They have attempted to treat this nonoperatively for years. At  this point they have exhausted all nonoperative options, and have persistent pain  and dysfunction. I offered them a total knee arthroplasty for treatment  of their symptomatic arthrosis. The risks and benefits of total knee  arthroplasty were clearly explained to the patient, and they elected to proceed  despite these risks. DETAILS OF PROCEDURE: The patient was greeted in the preoperative holding  area, and their operative extremity was identified and marked with an  indelible marker. The patient was transported to the operating theater where  anesthesia was obtained. The patient was placed supine on the operating table  table. A bump was applied under the ipsilateral hip.  A placed followed by cast padding and an Ace bandage. The tourniquet was deflated and the patient was transported to their hospital bed. The patient was transported to the post-operative anesthesia care unit in stable condition. All sponge and needle counts were correct x 2.

## 2021-05-18 NOTE — PROGRESS NOTES
Pt admitted to PACU s/p MINIMALLY INVASIVE LEFT TOTAL KNEE ARTHROPLASTY - Left, pt unable to hold still due to pain- CRNA at bedside aware- - epidural /canal block pre op -(epidural removed in OR)- PO 96% w O2 2L- L leg ace CDI

## 2021-05-18 NOTE — PROGRESS NOTES
Procedure: peripheral block and epidural  MD: Dr. Alexia Umana performed. Pt monitored continuously on 2L NC, continuous pulse oximetry, heart monitor and frequent BPs. Pt tolerated well.

## 2021-05-18 NOTE — PROGRESS NOTES
Consciousness: Alert, Oriented, and Cooperative = 0    Level of Activity: Walking with assistance = 1    Respiratory Pattern: Regular Pattern; RR 8-20 = 0    Breath Sounds: Clear = 0    Sputum   ,  , Sputum How Obtained: Spontaneous cough  Cough: Strong, spontaneous, non-productive = 0    Vital Signs   /67   Pulse 99   Temp 97.8 °F (36.6 °C) (Oral)   Resp 16   Ht 5' 6.5\" (1.689 m)   Wt 255 lb (115.7 kg)   SpO2 93%   BMI 40.54 kg/m²   SPO2 (COPD values may differ): 90-91% on room air or greater than 92% on FiO2 24- 28% = 1    Peak Flow (asthma only): not applicable = 0    RSI: 0-4 = See once and convert to home regimen or discontinue        Plan       Goals: medication delivery, mobilize retained secretions, volume expansion and improve oxygenation    Patient/caregiver was educated on the proper method of use for Respiratory Care Devices:  Yes      Level of patient/caregiver understanding able to:   ? Verbalize understanding   ? Demonstrate understanding       ? Teach back        ? Needs reinforcement       ? No available caregiver               ? Other:     Response to education:  Good     Is patient being placed on Home Treatment Regimen? Yes     Does the patient have everything they need prior to discharge? NA     Comments: reviewed chart and assessed patient     Plan of Care: continue as ordered    Electronically signed by Ervin Lew RCP on 5/18/2021 at 4:01 PM    Respiratory Protocol Guidelines     1. Assessment and treatment by Respiratory Therapy will be initiated for medication and therapeutic interventions upon initiation of aerosolized medication. 2. Physician will be contacted for respiratory rate (RR) greater than 35 breaths per minute. Therapy will be held for heart rate (HR) greater than 140 beats per minute, pending direction from physician. 3. Bronchodilators will be administered via Metered Dose Inhaler (MDI) with spacer when the following criteria are met:  a.  Alert and cooperative     b. HR < 140 bpm  c. RR < 30 bpm                d. Can demonstrate a 2-3 second inspiratory hold  4. Bronchodilators will be administered via Hand Held Nebulizer [facility]) to patients when ANY of the following criteria are met  a. Incognizant or uncooperative          b. Patients treated with HHN at Home        c. Unable to demonstrate proper use of MDI with spacer     d. RR > 30 bpm   5. Bronchodilators will be delivered via Metered Dose Inhaler (MDI), HHN, Aerogen to intubated patients on mechanical ventilation. 6. Inhalation medication orders will be delivered and/or substituted as outlined below. Aerosolized Medications Ordering and Administration Guidelines:    1. All Medications will be ordered by a physician, and their frequency and/or modality will be adjusted as defined by the patients Respiratory Severity Index (RSI) score. 2. If the patient does not have documented COPD, consider discontinuing anticholinergics when RSI is less than 9.  3. If the bronchospasm worsens (increased RSI), then the bronchodilator frequency can be increased to a maximum of every 4 hours. If greater than every 4 hours is required, the physician will be contacted. 4. If the bronchospasm improves, the frequency of the bronchodilator can be decreased, based on the patient's RSI, but not less than home treatment regimen frequency. 5. Bronchodilator(s) will be discontinued if patient has a RSI less than 9 and has received no scheduled or as needed treatment for 72  Hrs. Patients Ordered on a Mucolytic Agent:    1. Must always be administered with a bronchodilator. 2. Discontinue if patient experiences worsened bronchospasm, or secretions have lessened to the point that the patient is able to clear them with a cough. Anti-inflammatory and Combination Medications:    1.  If the patient lacks prior history of lung disease, is not using inhaled anti-inflammatory medication at home, and lacks wheezing by examination or by history for at least 24 hours, contact physician for possible discontinuation.

## 2021-05-18 NOTE — PROGRESS NOTES
Physical Therapy    Facility/Department: [facility] 5T ORTHO/NEURO  Initial Assessment and treatment    NAME: Marcia Watson  : 1950  MRN: [id number]    Date of Service: 2021    Discharge Recommendations:Rhea MARTINEZ Yudialexander scored a 15/24 on the AM-PAC short mobility form. Current research shows that an AM-PAC score of 18 or greater is typically associated with a discharge to the patient's home setting. Based on the patient's AM-PAC score and their current functional mobility deficits, it is recommended that the patient have 2-3 sessions per week of Physical Therapy at d/c to increase the patient's independence. At this time, this patient demonstrates the endurance and safety to discharge home with home services and a follow up treatment frequency of 2-3x/wk. Please see assessment section for further patient specific details. If patient discharges prior to next session this note will serve as a discharge summary. Please see below for the latest assessment towards goals. PT Equipment Recommendations  Equipment Needed: No (has 4WW and used brakes well)    Assessment   Body structures, Functions, Activity limitations: Decreased functional mobility ; Decreased balance;Decreased endurance;Decreased strength  Assessment: The pt is a 65 y/o female who presents s/p LTKA. She demonstrates L LE weakness with her leg giving out twice during ambulation requiring mod A to ambulate with RW. Her O2 also decreased with longer ambulation bout to 82%. Pt unsafe to attempt stairs with today. Pt is typically independent with mobility with cane or 4WW. She would benefit from continued PT at discharge to improve her safety and independence with mobility. Treatment Diagnosis: impaired mobility 2/2 L TKA  Prognosis: Good  Decision Making: Medium Complexity  PT Education: PT Role;Plan of Care;Goals; General Safety;Gait Training;Transfer Training  Patient Education: pt would benefit from reinforcement  Barriers to Learning: cognition/pt feeling drowsy  REQUIRES PT FOLLOW UP: Yes  Activity Tolerance  Activity Tolerance: Patient limited by fatigue;Patient limited by pain; Patient limited by endurance  Activity Tolerance: legs giving out; weakness       Patient Diagnosis(es): The encounter diagnosis was Arthritis of left knee. has a past medical history of Arthritis, Asthma, Atrial fibrillation ([facility] [address] Cancer ([facility] [address] DDD (degenerative disc disease), cervical, Degenerative disc disease, lumbar, Diabetes mellitus ([facility] [address] Hyperlipidemia, Hypertension, MRSA (methicillin resistant staph aureus) culture positive, and PONV (postoperative nausea and vomiting). has a past surgical history that includes Appendectomy; Knee arthroscopy (Right); Breast surgery (Bilateral); Breast lumpectomy; Carpal tunnel release (Bilateral); Hysterectomy; Tonsillectomy; selena and bso (cervix removed); Total knee arthroplasty (Right, 2/23/2021); and joint replacement (Right, 2021). Restrictions  Position Activity Restriction  Other position/activity restrictions: Activity day of surgery: 1)  Dangle at edge of bed, progress to stand, and take a few steps with assistive device. 2)  Encourage ankle pumps and quad sets. 3)  Up in bedside chair as tolerated. 4)  Commode privileges with assistance. 5)  Advance to ambulation in hallway on day of surgery as tolerated; WBAT  Vision/Hearing  Vision: Impaired  Vision Exceptions: Wears glasses for reading  Hearing: Within functional limits     Subjective  General  Chart Reviewed: Yes  Patient assessed for rehabilitation services?: Yes  Additional Pertinent Hx: Pt presents for planned LTKA on 5/18. Family / Caregiver Present: No  Referring Practitioner: Dr. Niyah Pelaez  Diagnosis: OA of L knee  Follows Commands: Impaired (delayed)  General Comment  Comments: The pt presents supine in bed and willing to work with therapy. Subjective  Subjective: \"I feel really tired. \"  Pain Screening  Patient Currently in Pain: Denies (pt with increased pain throughout session ending with 6/10 pain)  Vital Signs  Patient Currently in Pain: Denies (pt with increased pain throughout session ending with 6/10 pain)       Orientation     Social/Functional History  Social/Functional History  Lives With: Spouse  Type of Home: House  Home Layout: One level  Home Access: Stairs to enter with rails  Entrance Stairs - Number of Steps: 3  Entrance Stairs - Rails: Left  Bathroom Shower/Tub: Walk-in shower  Bathroom Toilet: Handicap height  Bathroom Equipment: Shower chair, Grab bars in shower, Grab bars around toilet  Bathroom Accessibility: Accessible  Home Equipment: 4 wheeled walker, [employer]  ADL Assistance: [address]: Independent  Homemaking Responsibilities: Yes  Ambulation Assistance: Independent (w/ use of walker)  Transfer Assistance: Independent  Active : Yes  Occupation: Retired  Additional Comments: no falls in the past 6 months; R Knee replaced in [address]  Overall Cognitive Status: Exceptions  Arousal/Alertness: Delayed responses to stimuli  Following Commands: Follows one step commands consistently    Objective  Strength RLE  Strength RLE: WFL  Strength LLE  Strength LLE: Exception  Comment: weak post surgery        Bed mobility  Supine to Sit: Stand by assistance (HOB elevated  Simultaneous filing. User may not have seen previous data.)  Sit to Supine: Stand by assistance (Simultaneous filing. User may not have seen previous data.)  Scooting: Stand by assistance (to EOB)  Comment: pt on 2L O2 was 92% , without O2 sitting EOB is 87-90%  Transfers  Sit to Stand: Contact guard assistance;Minimal Assistance (CGA-min A for transfers from bed, chair, toilet, rollator)  Stand to sit: Contact guard assistance  Comment: cueing for hand placement  Ambulation  Ambulation?: Yes  Ambulation 1  Surface: level tile  Device: Rollator  Assistance:  Moderate assistance (CGA mostly but mod A for L knee giving out x 2)  Quality of Gait: slow ilana, antalgia favoring L LE, decreased step height, length, shuffling at times  Distance: 20'+ 60'+ 80'  Comments: pt requiring seated rest break due to her knee giving out x 2 on longer ambulation bout to the stairs. Unable to trial stairs safely today 2/2 L LE weakness/ giving out. After short ambulation bout without O2, sats to 87% quickly rising to 90% with cueing for pursed lip breathing but down to 82% with longer ambulation bout  Stairs/Curb  Stairs?: No     Balance  Sitting - Static: Fair;+  Sitting - Dynamic: Fair  Standing - Static: Fair;-  Standing - Dynamic: Poor  Comments: Pt CGA-SBA sitting EOB for several minutes ~8        Plan   Plan  Times per week: 7  Times per day: Twice a day  Current Treatment Recommendations: Strengthening, Transfer Training, Endurance Training, Neuromuscular Re-education, Patient/Caregiver Education & Training, Balance Training, Gait Training, Functional Mobility Training, Stair training, Safety Education & Training, Home Exercise Program  Safety Devices  Type of devices: Left in bed, Call light within reach, Nurse notified (in PACU)    AM-PAC Score  AM-PAC Inpatient Mobility Raw Score : 15 (05/18/21 1627)  AM-PAC Inpatient T-Scale Score : 39.45 (05/18/21 1627)  Mobility Inpatient CMS 0-100% Score: 57.7 (05/18/21 1627)  Mobility Inpatient CMS G-Code Modifier : CK (05/18/21 1627)       Treatment:  Functional mobility training and pt education       Goals  Short term goals  Time Frame for Short term goals: By discharge  Short term goal 1: The pt will perform bed mobility with mod I  Short term goal 2: The pt will transfer with SBA  Short term goal 3: The pt will ambulate with rollator and SBA x 150'  Short term goal 4: The pt will go up/down 3 stairs with 1 rail and CGA  Patient Goals   Patient goals :  To go home       Therapy Time   Individual Concurrent Group Co-treatment   Time In 1320         Time Out 1359         Minutes 39         Timed Code Treatment Minutes: 24 Minutes    Timed Code Treatment Minutes:  24 Minutes    Total Treatment Minutes:  39 minutes      Cinthya Larios PT

## 2021-05-18 NOTE — CONSULTS
Hospital Medicine  Consult History & Physical        Chief Complaint: Left knee pain    Date of Service: Pt seen/examined in consultation on 5/18/2021    History Of Present Illness:      69-year-old female with past medical history of hypertension, diabetes mellitus, COPD presented to the hospital for left knee surgery. Patient has been having chronic left knee pain, swelling which were recalcitrant to conservative treatment and patient decided to undergo elective surgical treatment. She underwent left total knee arthroplasty today. Sound was consulted for postoperative medical management. At the time of my assessment patient states her pain was adequately controlled and she denies any new complaints. Past Medical History:        Diagnosis Date    Arthritis     Asthma     Atrial fibrillation ([address]     Cancer (HCC)     skin    DDD (degenerative disc disease), cervical     Degenerative disc disease, lumbar     Diabetes mellitus ([address]     Hyperlipidemia     Hypertension     MRSA (methicillin resistant staph aureus) culture positive 02/23/2021    colonization    PONV (postoperative nausea and vomiting)        Past Surgical History:        Procedure Laterality Date    APPENDECTOMY      BREAST LUMPECTOMY      BREAST SURGERY Bilateral     biopsy    CARPAL TUNNEL RELEASE Bilateral     HYSTERECTOMY      JOINT REPLACEMENT Right 2021    KNEE    KNEE ARTHROSCOPY Right     NICK AND BSO      TONSILLECTOMY      TOTAL KNEE ARTHROPLASTY Right 2/23/2021    RIGHT MINIMALLY INVASIVE TOTAL KNEE ARTHROPALSTY performed by Yimi Chung MD at [address]       Medications Prior to Admission:    Prior to Admission medications    Medication Sig Start Date End Date Taking? Authorizing Provider   oxyCODONE (ROXICODONE) 5 MG immediate release tablet Take 1 tablet by mouth every 6 hours as needed for Pain for up to 7 days. Intended supply: 7 days.  Take lowest dose possible to manage pain 5/18/21 5/25/21 Yes Vivien Osuna MD   MYRBETRIQ 50 MG TB24 TAKE 1 TABLET BY MOUTH DAILY 1/29/21  Yes Historical Provider, MD   acetaminophen (TYLENOL) 500 MG tablet Take 1,000 mg by mouth 2 times daily   Yes Historical Provider, MD   amLODIPine (NORVASC) 5 MG tablet nightly  1/6/21  Yes Historical Provider, MD   aspirin 81 MG chewable tablet 81 mg daily   Yes Historical Provider, MD   budesonide-formoterol (SYMBICORT) 160-4.5 MCG/ACT AERO INHALE 2 PUFFS BY MOUTH TWICE DAILY 2/3/21  Yes Historical Provider, MD   dilTIAZem WONG [facility]) 180 MG extended release capsule TAKE 1 CAPSULE DAILY 7/20/20  Yes Historical Provider, MD   ergocalciferol (ERGOCALCIFEROL) 1.25 MG (65325 UT) capsule TAKE 1 CAPSULE EVERY 30 DAYS 3/2/20  Yes Historical Provider, MD   tiotropium (SPIRIVA RESPIMAT) 1.25 MCG/ACT AERS inhaler Inhale 2 sprays into the lungs daily 2/5/21  Yes Historical Provider, MD   fluticasone (FLONASE) 50 MCG/ACT nasal spray 1 spray by Nasal route 9/25/17  Yes Historical Provider, MD   spironolactone (ALDACTONE) 50 MG tablet TAKE 1 TABLET TWICE A DAY 9/18/17  Yes Historical Provider, MD   glipiZIDE (GLUCOTROL XL) 10 MG extended release tablet TAKE 1 TABLET DAILY 8/14/17  Yes Historical Provider, MD   metFORMIN (GLUCOPHAGE) 1000 MG tablet TAKE 1 TABLET TWICE A DAY WITH MEALS 3/12/17  Yes Historical Provider, MD   losartan (COZAAR) 100 MG tablet TAKE 1 TABLET DAILY 8/16/17  Yes Historical Provider, MD   albuterol sulfate  (90 Base) MCG/ACT inhaler USE 2 INHALATIONS EVERY 6 HOURS AS NEEDED 7/27/20   Historical Provider, MD       Allergies:  Atorvastatin and Pioglitazone    Social History:        TOBACCO:   reports that she has never smoked. She has never used smokeless tobacco.  ETOH:   reports current alcohol use. Family History:      Reviewed in detail and negative for DM, CAD, Cancer, CVA. No family history on file. REVIEW OF SYSTEMS COMPLETED:   Pertinent positives as noted in the HPI.  All other systems reviewed and negative. PHYSICAL EXAM PERFORMED:  /67   Pulse 99   Temp 97.8 °F (36.6 °C) (Oral)   Resp 16   Ht 5' 6.5\" (1.689 m)   Wt 255 lb (115.7 kg)   SpO2 93%   BMI 40.54 kg/m²   General appearance: No apparent distress, appears stated age and cooperative. Hard of hearing  HEENT: Normal cephalic, atraumatic without obvious deformity. Pupils equal, round, and reactive to light. Extra ocular muscles intact. Conjunctivae/corneas clear. Neck: Supple, with full range of motion. No jugular venous distention. Trachea midline. Respiratory:  Normal respiratory effort. Clear to auscultation, bilaterally without Rales/Wheezes/Rhonchi. Cardiovascular: Regular rate and rhythm with normal S1/S2 without murmurs, rubs or gallops. Abdomen: Soft, non-tender, non-distended with normal bowel sounds. Musculoskeletal: Left knee/leg wrapped in bandage  Skin: Skin color, texture, turgor normal.  No rashes or lesions. Neurologic:  Neurovascularly intact without any focal sensory/motor deficits. Cranial nerves: II-XII intact, grossly non-focal.  Psychiatric: Alert and oriented, thought content appropriate, normal insight  Capillary Refill: Brisk,3 seconds, normal   Peripheral Pulses: +2 palpable, equal bilaterally     Labs:     No results for input(s): WBC, HGB, HCT, PLT in the last 72 hours. No results for input(s): NA, K, CL, CO2, BUN, CREATININE, CALCIUM, PHOS in the last 72 hours. Invalid input(s): MAGNES  No results for input(s): AST, ALT, BILIDIR, BILITOT, ALKPHOS in the last 72 hours. No results for input(s): INR in the last 72 hours. No results for input(s): Pickton Hind in the last 72 hours. Urinalysis:  No results found for: Umesh Prescott, BACTERIA, [address] [place] [address], [facility], [address], [facility] [address]    Radiology: I have reviewed the radiology reports with the following interpretation:     XR KNEE LEFT (1-2 VIEWS)   Final Result   Impression: Surgical changes of a left total knee arthroplasty. No acute abnormality.

## 2021-05-18 NOTE — PROGRESS NOTES
PACU Transfer Note    Vitals:    05/18/21 1425   BP: 138/78   Pulse: 97   Resp: 13   Temp: 98.6 °F (37 °C)   SpO2: 96%       In: 1496 [P.O.:300; I.V.:1196]  Out: 300 [Urine:300]    Pain assessment:  3    Report given to Receiving unit RN.     5/18/2021 2:44 PM

## 2021-05-18 NOTE — ANESTHESIA PROCEDURE NOTES
Peripheral Block    Patient location during procedure: pre-op  Start time: 5/18/2021 6:53 AM  End time: 5/18/2021 7:00 AM  Staffing  Performed: anesthesiologist   Anesthesiologist: Roger Wellington MD  Preanesthetic Checklist  Completed: patient identified, IV checked, site marked, risks and benefits discussed, surgical consent, monitors and equipment checked, pre-op evaluation, timeout performed, anesthesia consent given, oxygen available and patient being monitored  Peripheral Block  Patient position: supine  Prep: ChloraPrep  Patient monitoring: cardiac monitor, continuous pulse ox, frequent blood pressure checks and IV access  Block type: Femoral  Laterality: left  Injection technique: single-shot  Guidance: ultrasound guided  Adductor canal  Provider prep: mask and sterile gloves  Needle  Needle type: short-bevel   Needle gauge: 22 G  Needle length: 8 cm  Needle localization: ultrasound guidance  Assessment  Injection assessment: negative aspiration for heme, no paresthesia on injection and local visualized surrounding nerve on ultrasound  Paresthesia pain: none  Slow fractionated injection: yes  Hemodynamics: stable  Additional Notes  Sartorius and Vastus Medialis Muscle, Femoral artery and Saphenous nerve are identified; the tip of the needle and the spread of the local anesthetic around the Saphenous nerve are visualized. The Saphenous nerve appeared to be anatomically normal and there were no abnormal pathologically findings seen. Left Adductor Canal Block Note    Indication: Postoperative analgesia upon request of the attending surgeon. Procedure: Informed consent obtained and pre-procedural timeout procedure performed. Patient supine. Left thigh externally rotated. Sterile prep.   A 22g 80mm insulated regional block needle was inserted at the level of the mid-thigh and directed under ultrasound visualization into the adductor canal, with the needle tip visualized just lateral to the femoral artery. Ropivacaine 0.5% was injected under ultrasound visualization with good spread noted around the femoral artery. 30cc total volume injected. Negative aspiration for heme prior to injection and at several points during injection. Procedure tolerated well. No apparent complications. Medications Administered  Ropivacaine (NAROPIN) injection 0.5%, 30 mL  Reason for block: post-op pain management            Dianna Watson.  Lakshmi Encinas MD  May 18, 2021 7:23 AM

## 2021-05-18 NOTE — ANESTHESIA PROCEDURE NOTES
Epidural Block    Patient location during procedure: pre-op  Start time: 5/18/2021 7:02 AM  End time: 5/18/2021 7:12 AM  Reason for block: post-op pain management  Staffing  Performed: anesthesiologist   Anesthesiologist: Joseph De MD  Preanesthetic Checklist  Completed: patient identified, IV checked, site marked, risks and benefits discussed, surgical consent, monitors and equipment checked, pre-op evaluation, timeout performed, anesthesia consent given, oxygen available and patient being monitored  Epidural  Patient position: sitting  Prep: ChloraPrep  Patient monitoring: cardiac monitor, continuous pulse ox and frequent blood pressure checks  Approach: right paramedian (First attempt midline)  Location: lumbar (1-5)  Injection technique: CLARENCE air  Provider prep: mask and sterile gloves  Needle  Needle type: Tuohy   Needle gauge: 17 G  Needle length: 3.5 in  Needle insertion depth: 6 cm  Catheter type: side hole  Catheter size: 19 G  Catheter at skin depth: 16 cm  Test dose: negative  Kit: Perifix FX Continuous Epidural Anesthesia Tray  Lot number: [id number]  Expiration date: 1/1/2022  Assessment  Hemodynamics: stable  Attempts: 2  Additional Notes  Epidural Note    Seated position. Landmarks identified. Sterile prep and drape. Lidocaine 1% skin wheal at L3-4.  17G Tuohy passed with loss of resistance at 6cm via right paramedian approach after failed first midline attempt. Catheter passed easily to 16cm. Tuohy removed. Catheter secured. Lidocaine 1.5% with Epinephrine 1:200,000 administered to 5cc total at conclusion of procedure. No apparent complications at the time of the procedure. Mookie Young.  Pamela Roth MD  May 18, 2021 7:26 AM

## 2021-05-18 NOTE — PLAN OF CARE
Problem: Falls - Risk of:  Goal: Will remain free from falls  Description: Fall precautions in place. Bed is in lowest position, wheels locked, alarm on, non-skid socks on. Call light and bedside table within reach. Patient calls out appropriately. Patient has not been OOB yet. Will continue to assess and monitor. Outcome: Ongoing     Problem: Pain - Acute:  Goal: Pain level will decrease  Description: Pain level will decrease. Patient resting comfortably at this time,  at bedside.   Outcome: Ongoing

## 2021-05-18 NOTE — H&P
Qiana BURRELL [facility]    [id number]    [facility] Same Day Surgery Update H & P  Department of General Surgery   Surgical Service   Pre-operative History and Physical  Last H & P within the last 30 days. DIAGNOSIS:   Osteoarthritis of left knee, unspecified osteoarthritis type [M17.12]    Procedure(s): MINIMALLY INVASIVE LEFT TOTAL KNEE ARTHROPLASTY     HISTORY OF PRESENT ILLNESS:   Patient is a morbidly obese (Body mass index is 40.54 kg/m².), 66 y.o. female with chronic left  knee pain, swelling and instability in the setting of arthrosis. The symptoms have been recalcitrant to conservative treatment and the patient presents today for the above procedure. Covid 19:  Patient denies fever, chills, or known exposure to Covid-19. Patient with chronic cough which she states is unchanged.       Past Medical History:        Diagnosis Date    Arthritis     Asthma     Atrial fibrillation (HCC)     Cancer (HCC)     skin    DDD (degenerative disc disease), cervical     Degenerative disc disease, lumbar     Diabetes mellitus ([facility] [address]     Hyperlipidemia     Hypertension     MRSA (methicillin resistant staph aureus) culture positive 02/23/2021    colonization    PONV (postoperative nausea and vomiting)      Past Surgical History:        Procedure Laterality Date    APPENDECTOMY      BREAST LUMPECTOMY      BREAST SURGERY Bilateral     biopsy    CARPAL TUNNEL RELEASE Bilateral     HYSTERECTOMY      JOINT REPLACEMENT Right 2021    KNEE    KNEE ARTHROSCOPY Right     NICK AND BSO      TONSILLECTOMY      TOTAL KNEE ARTHROPLASTY Right 2/23/2021    RIGHT MINIMALLY INVASIVE TOTAL KNEE ARTHROPALSTY performed by Yasemin Espinosa MD at [facility] OR     Past Social History:  Social History     Socioeconomic History    Marital status:      Spouse name: Not on file    Number of children: Not on file    Years of education: Not on file    Highest education level: Not on file   Occupational History    Not on file   Tobacco Use    Smoking status: Never Smoker    Smokeless tobacco: Never Used   Substance and Sexual Activity    Alcohol use: Yes     Comment: social     Drug use: No    Sexual activity: Yes   Other Topics Concern    Not on file   Social History Narrative    Not on file     Social Determinants of Health     Financial Resource Strain:     Difficulty of Paying Living Expenses:    Food Insecurity:     Worried About Running Out of Food in the Last Year:     [address] in the Last Year:    Transportation Needs:     Lack of Transportation (Medical):  Lack of Transportation (Non-Medical):    Physical Activity:     Days of Exercise per Week:     Minutes of Exercise per Session:    Stress:     Feeling of Stress :    Social Connections:     Frequency of Communication with Friends and Family:     Frequency of Social Gatherings with Friends and Family:     Attends Muslim Services:     Active Member of Clubs or Organizations:     Attends Club or Organization Meetings:     Marital Status:    Intimate Partner Violence:     Fear of Current or Ex-Partner:     Emotionally Abused:     Physically Abused:     Sexually Abused:          Medications Prior to Admission:      Prior to Admission medications    Medication Sig Start Date End Date Taking?  Authorizing Provider   MYRBETRIQ 50 MG TB24 TAKE 1 TABLET BY MOUTH DAILY 1/29/21  Yes Historical Provider, MD   acetaminophen (TYLENOL) 500 MG tablet Take 1,000 mg by mouth 2 times daily   Yes Historical Provider, MD   albuterol sulfate  (90 Base) MCG/ACT inhaler USE 2 INHALATIONS EVERY 6 HOURS AS NEEDED 7/27/20  Yes Historical Provider, MD   amLODIPine (NORVASC) 5 MG tablet nightly  1/6/21  Yes Historical Provider, MD   aspirin 81 MG chewable tablet 81 mg daily   Yes Historical Provider, MD   budesonide-formoterol (SYMBICORT) 160-4.5 MCG/ACT AERO INHALE 2 PUFFS BY MOUTH TWICE DAILY 2/3/21  Yes Historical Provider, MD Magda WONG [facility]) 180 MG extended release capsule TAKE 1 CAPSULE DAILY 7/20/20  Yes Historical Provider, MD   ergocalciferol (ERGOCALCIFEROL) 1.25 MG (95541 UT) capsule TAKE 1 CAPSULE EVERY 30 DAYS 3/2/20  Yes Historical Provider, MD   tiotropium (SPIRIVA RESPIMAT) 1.25 MCG/ACT AERS inhaler Inhale 2 sprays into the lungs daily 2/5/21  Yes Historical Provider, MD   fluticasone (FLONASE) 50 MCG/ACT nasal spray 1 spray by Nasal route 9/25/17  Yes Historical Provider, MD   spironolactone (ALDACTONE) 50 MG tablet TAKE 1 TABLET TWICE A DAY 9/18/17  Yes Historical Provider, MD   glipiZIDE (GLUCOTROL XL) 10 MG extended release tablet TAKE 1 TABLET DAILY 8/14/17  Yes Historical Provider, MD   metFORMIN (GLUCOPHAGE) 1000 MG tablet TAKE 1 TABLET TWICE A DAY WITH MEALS 3/12/17  Yes Historical Provider, MD   losartan (COZAAR) 100 MG tablet TAKE 1 TABLET DAILY 8/16/17  Yes Historical Provider, MD         Allergies:  Atorvastatin and Pioglitazone    PHYSICAL EXAM:      /73   Pulse 87   Temp 98.2 °F (36.8 °C) (Oral)   Resp 18   Ht 5' 6.5\" (1.689 m)   Wt 255 lb (115.7 kg)   SpO2 95%   BMI 40.54 kg/m²      Airway:  Airway patent with no audible stridor    Heart:  Regular rate and rhythm, No murmur noted    Lungs:  No increased work of breathing, good air exchange, clear to auscultation bilaterally, no crackles or wheezing    Abdomen:  Soft, non-distended, non-tender, normal active bowel sounds, no masses palpated    ASSESSMENT AND PLAN    Patient is a 66 y.o. female with above specified procedure planned. 1.  The patients history and physical was obtained and signed off by the pre-admission testing department. Patient seen and focused exam done today- no new changes since last physical exam on 5/5/21    2. Access to ancillary services are available per request of the provider.     OLIVA Singh - CNP     5/18/2021

## 2021-05-18 NOTE — PROGRESS NOTES
Total 1.5 dil IV-   [facility] called and aware of c/o pain- toradol 15 IV

## 2021-05-18 NOTE — PROGRESS NOTES
Patient admitted to [address] [place] [address]. VSS, on 2L oxygen nasal cannula. Ordered food,  at bedside. Ace wrap CD&I. Strong push pulls.

## 2021-05-19 VITALS
OXYGEN SATURATION: 91 % | RESPIRATION RATE: 16 BRPM | BODY MASS INDEX: 40.02 KG/M2 | HEART RATE: 91 BPM | TEMPERATURE: 98.1 F | WEIGHT: 255 LBS | SYSTOLIC BLOOD PRESSURE: 127 MMHG | DIASTOLIC BLOOD PRESSURE: 71 MMHG | HEIGHT: 67 IN

## 2021-05-19 LAB
GLUCOSE BLD-MCNC: 161 MG/DL (ref 70–99)
HCT VFR BLD CALC: 35.1 % (ref 36–48)
HEMOGLOBIN: 11.8 G/DL (ref 12–16)
MRSA SCREEN RT-PCR: NORMAL
PERFORMED ON: ABNORMAL

## 2021-05-19 PROCEDURE — 2580000003 HC RX 258: Performed by: ORTHOPAEDIC SURGERY

## 2021-05-19 PROCEDURE — 6360000002 HC RX W HCPCS: Performed by: ORTHOPAEDIC SURGERY

## 2021-05-19 PROCEDURE — 97116 GAIT TRAINING THERAPY: CPT

## 2021-05-19 PROCEDURE — G0378 HOSPITAL OBSERVATION PER HR: HCPCS

## 2021-05-19 PROCEDURE — 96376 TX/PRO/DX INJ SAME DRUG ADON: CPT

## 2021-05-19 PROCEDURE — 94640 AIRWAY INHALATION TREATMENT: CPT

## 2021-05-19 PROCEDURE — 85018 HEMOGLOBIN: CPT

## 2021-05-19 PROCEDURE — 85014 HEMATOCRIT: CPT

## 2021-05-19 PROCEDURE — 97530 THERAPEUTIC ACTIVITIES: CPT

## 2021-05-19 PROCEDURE — 97535 SELF CARE MNGMENT TRAINING: CPT

## 2021-05-19 PROCEDURE — 6370000000 HC RX 637 (ALT 250 FOR IP): Performed by: ORTHOPAEDIC SURGERY

## 2021-05-19 PROCEDURE — 97110 THERAPEUTIC EXERCISES: CPT

## 2021-05-19 PROCEDURE — 96374 THER/PROPH/DIAG INJ IV PUSH: CPT

## 2021-05-19 PROCEDURE — 36415 COLL VENOUS BLD VENIPUNCTURE: CPT

## 2021-05-19 RX ADMIN — ACETAMINOPHEN 1000 MG: 500 TABLET ORAL at 05:42

## 2021-05-19 RX ADMIN — ARFORMOTEROL TARTRATE 15 MCG: 15 SOLUTION RESPIRATORY (INHALATION) at 00:02

## 2021-05-19 RX ADMIN — KETOROLAC TROMETHAMINE 15 MG: 30 INJECTION, SOLUTION INTRAMUSCULAR; INTRAVENOUS at 08:30

## 2021-05-19 RX ADMIN — OXYCODONE 10 MG: 5 TABLET ORAL at 05:42

## 2021-05-19 RX ADMIN — METHOCARBAMOL TABLETS 500 MG: 500 TABLET, COATED ORAL at 08:30

## 2021-05-19 RX ADMIN — DILTIAZEM HYDROCHLORIDE 180 MG: 180 CAPSULE, COATED, EXTENDED RELEASE ORAL at 08:30

## 2021-05-19 RX ADMIN — GLIPIZIDE 10 MG: 5 TABLET ORAL at 05:42

## 2021-05-19 RX ADMIN — KETOROLAC TROMETHAMINE 15 MG: 30 INJECTION, SOLUTION INTRAMUSCULAR; INTRAVENOUS at 05:42

## 2021-05-19 RX ADMIN — DOCUSATE SODIUM 50 MG AND SENNOSIDES 8.6 MG 1 TABLET: 8.6; 5 TABLET, FILM COATED ORAL at 08:30

## 2021-05-19 RX ADMIN — SPIRONOLACTONE 50 MG: 50 TABLET ORAL at 08:30

## 2021-05-19 RX ADMIN — CEFAZOLIN SODIUM 2000 MG: 2 SOLUTION INTRAVENOUS at 00:01

## 2021-05-19 RX ADMIN — Medication 10 ML: at 08:31

## 2021-05-19 RX ADMIN — BUDESONIDE 500 MCG: 0.5 SUSPENSION RESPIRATORY (INHALATION) at 00:02

## 2021-05-19 RX ADMIN — METFORMIN HYDROCHLORIDE 500 MG: 500 TABLET ORAL at 08:30

## 2021-05-19 RX ADMIN — ASPIRIN 81 MG: 81 TABLET, COATED ORAL at 08:30

## 2021-05-19 RX ADMIN — FLUTICASONE PROPIONATE 1 SPRAY: 50 SPRAY, METERED NASAL at 08:34

## 2021-05-19 RX ADMIN — TROSPIUM CHLORIDE 20 MG: 20 TABLET, FILM COATED ORAL at 05:42

## 2021-05-19 ASSESSMENT — PAIN SCALES - GENERAL: PAINLEVEL_OUTOF10: 7

## 2021-05-19 ASSESSMENT — PAIN DESCRIPTION - DESCRIPTORS: DESCRIPTORS: ACHING

## 2021-05-19 ASSESSMENT — PAIN DESCRIPTION - PAIN TYPE: TYPE: ACUTE PAIN

## 2021-05-19 ASSESSMENT — PAIN DESCRIPTION - PROGRESSION: CLINICAL_PROGRESSION: NOT CHANGED

## 2021-05-19 NOTE — PROGRESS NOTES
Occupational Therapy  Facility/Department: [facility] 5T ORTHO/NEURO  Daily Treatment Note  NAME: Severiano Sawyer  : 1950  MRN: [id number]    Date of Service: 2021    Discharge Recommendations:    Severiano Sawyer scored a 21/24 on the AM-PAC ADL Inpatient form. Current research shows that an AM-PAC score of 18 or greater is typically associated with a discharge to the patient's home setting. Based on the patient's AM-PAC score, and their current ADL deficits, it is recommended that the patient have 2-3 sessions per week of Occupational Therapy at d/c to increase the patient's independence. At this time, this patient demonstrates the endurance and safety to discharge home with 24hr assist  and a follow up treatment frequency of 2-3x/wk. Please see assessment section for further patient specific details. If patient discharges prior to next session this note will serve as a discharge summary. Please see below for the latest assessment towards goals. OT Equipment Recommendations  Other: has recommended equipment    Assessment   Assessment: Pt progressing well this session demonstrating LE dressing with CGA and good understanding of precautions. Functional mobility completed with CGA to SBA to and from bathroom. Toilet transfer completed with CGA with VCs for use of GB. pt would benefit from 24hr assist upon discharge home in care of . Continue OT per POC. Treatment Diagnosis: decreased independence with ADLs and fx mobility  OT Education: OT Role;Plan of Care;ADL Adaptive Strategies;Transfer Training;Energy Conservation  Patient Education: pt verb understanding  Activity Tolerance  Activity Tolerance: Patient Tolerated treatment well  Activity Tolerance: Pt wtih O2 remaining above 90% without O2 on. Patient Diagnosis(es): The encounter diagnosis was Arthritis of left knee.       has a past medical history of Arthritis, Asthma, Atrial fibrillation ([facility] [address] offered reacher but able to complete without. Pt has reacher at home. Toileting: CGA for steadying stance      Safety Devices in Place:  Pt left seated in recliner chair with alarm on and call light in reach.               Plan  If pt discharges prior to next treatment, this note will serve as discharge summary  Plan  Times per week: 5-7  Current Treatment Recommendations: Strengthening, Endurance Training, Self-Care / ADL, Balance Training, Functional Mobility Training, Safety Education & Training           AM-PAC Score        AM-PAC Inpatient Daily Activity Raw Score: 21 (05/19/21 1125)  AM-PAC Inpatient ADL T-Scale Score : 44.27 (05/19/21 1125)  ADL Inpatient CMS 0-100% Score: 32.79 (05/19/21 1125)  ADL Inpatient CMS G-Code Modifier : Leno Jose (05/19/21 1125)    Goals (as determined and assessed by primary OT)  Short term goals  Time Frame for Short term goals: by discharge  Short term goal 1: Pt will perform ambulatory toilet transfer with SBA- not met  Short term goal 2: Pt will perform UB/LB dressing routine with SBA and AE as needed- not met  Patient Goals   Patient goals : return home       Therapy Time   Individual Concurrent Group Co-treatment   Time In 0803         Time Out 5760         Minutes 52         Timed Code Treatment Minutes: Adán 94, R Gabriel Ingram 46

## 2021-05-19 NOTE — PROGRESS NOTES
Patient discharged via wheelchair with all belongings, walker, along side . IVs removed. Discharge instructions reviewed, all questions answered. Knee dressing CD&I.

## 2021-05-19 NOTE — PROGRESS NOTES
Physical Therapy  Daily Treatment Note    Discharge Recommendations: Tonya Fairchild scored a 18/24 on the AM-PAC short mobility form. Current research shows that an AM-PAC score of 18 or greater is typically associated with a discharge to the patient's home setting. Based on the patient's AM-PAC score and their current functional mobility deficits, it is recommended that the patient have 2-3 sessions per week of Physical Therapy at d/c to increase the patient's independence. At this time, this patient demonstrates the endurance and safety to discharge home with home vs OP services and a follow up treatment frequency of 2-3x/wk. Please see assessment section for further patient specific details. Assessment:  Pt moving well POD #1. No L knee buckling noted with ambulation or on stairs. Pt with steady gait using rolator. Did well on stairs with one railing. Safe transfer technique. Doing well with ROM exercises. Plan is for home with 24 hour assist of  and OP PT. No new DME needs. Equipment Needs: No new needs     Chart Reviewed: Yes     Other position/activity restrictions: Activity day of surgery: 1)  Dangle at edge of bed, progress to stand, and take a few steps with assistive device. 2)  Encourage ankle pumps and quad sets. 3)  Up in bedside chair as tolerated. 4)  Commode privileges with assistance. 5)  Advance to ambulation in hallway on day of surgery as tolerated; WBAT   Additional Pertinent Hx: Pt presents for planned LTKA on 5/18. Diagnosis: OA of L knee   Treatment Diagnosis: impaired mobility 2/2 L TKA    Subjective: Pt in chair initially. Ready to work with PT. Anticipates going home today.  arrived at end of session. Pain: 3/10 at rest. \"Worse when I'm standing on it. \" Ice packs to knee after therapy.      Objective:    Transfers  Sit to stand: SBA from chair/wheelchair/rollator (x 4 trials total)  Stand to sit: SBA into wheelchair/rollator/bed (x 4 trails total)    Ambulation  Assistance Level: CGA initially, progressing to SBA  Assistive device: Rollator  Distance: 50 ft, 120 ft, 15 ft. Seated rest between walks. Quality of gait: Step-to pattern; moderate reliance on rollator for support; steady; antalgic L LE    Stairs  Up/down 2 steps x 2 (4 total) with B hands on one railing CGA. Step-to pattern. Min cues only initially for sequencing. Bed mobility  Sit to Supine: SBA, HOB flat without rail. Effortful for L LE. Exercises  10 reps B ankle pumps, quad sets, glut sets, L heel slides, hip abd/add, SAQ, SLR with SBA and cues for instruction only    ROM  ~10-85 L knee    Vitals  Oxygen saturation on RA after longer walk = 90%. Quickly improved to 95% (within 60 seconds.) RN aware. Patient Education  Stair negotiation with one railing. Demonstrated safely. Continuing HEP at home 3/day. Expressed understanding. Calling for assist with needs. Expressed understanding. Safety Devices  Pt left with needs in reach. In bed with bed alarm on. RN updated.  present.      AM-PAC score  AM-PAC Inpatient Mobility Raw Score : 18  AM-PAC Inpatient T-Scale Score : 43.63  Mobility Inpatient CMS 0-100% Score: 46.58  Mobility Inpatient CMS G-Code Modifier : CK    Goals: (as determined and assessed by primary PT)  Time Frame for Short term goals: By discharge  Short term goal 1: The pt will perform bed mobility with mod I   Short term goal 2: The pt will transfer with SBA   Short term goal 3: The pt will ambulate with rollator and SBA x 150'  Short term goal 4: The pt will go up/down 3 stairs with 1 rail and CGA     Plan:  Times per week: 7; Times per day: Twice a day  Current Treatment Recommendations: Strengthening, Transfer Training, Endurance Training, Neuromuscular Re-education, Patient/Caregiver Education & Training, Balance Training, Gait Training, Functional Mobility Training, Stair training, Safety Education & Training, Home Exercise Program    Therapy Time    Individual  Concurrent  Group  Co-treatment    Time In  931            Time Out  1014            Minutes  43              Timed Code Treatment Minutes: 43  Total Treatment Minutes: 37    Anticipate D/C home with  today. Will continue per plan of care if not D/Edward. If patient is discharged prior to next treatment, this note will serve as the discharge summary.     Verona Butler #[id number]

## 2021-05-19 NOTE — PROGRESS NOTES
Department of Orthopedic Surgery     Progress Note    Subjective:   Admit Day:5/18/2021    Patient is comfortable appearing. Denies chest pain, shortness of air or calf pain. Tolerating PO. Objective[de-identified]    height is 5' 6.5\" (1.689 m) and weight is 255 lb (115.7 kg). Her oral temperature is 98.1 °F (36.7 °C). Her blood pressure is 127/71 and her pulse is 91. Her respiration is 16 and oxygen saturation is 91%. General:  No acute distress. Operative Incision:  Dressing is clean, dry and intact. Operative Extremity:  - Motor function intact to Tibialis Anterior, Gastroc-Soleus Complex, Extensor Hallucis Longus, and Flexor Hallucis Longus.  -  Sensation intact to light touch in sural, saphenous, superficial peroneal, deep peroneal and tibial nerve distributions. -  Toes are wwp with a palpable dorsalis pedis pulse. Negative Ambrocio's Sign Bilaterally    Labs:  Lab Results   Component Value Date    HGB 11.8 05/19/2021    HCT 35.1 05/19/2021       No results found for: INR    No results found for: NA, K, CO2, BUN, CREATININE, CALCIUM    Assessment:   Principal Problem:    Arthritis of left knee  Resolved Problems:    * No resolved hospital problems. *      POD # 1 s/p TKA. Doing well. Pt complains of increased pain this morning. Pt doing well and dressing CDI. Will plan on sending patient home today with outpatient PT pending functional mobility status    Plan:   1. Weightbearing as tolerated   2. DVT Prophylaxis:  ASA  3. PT/OT per protocol  4. Pain control: per protocol    Disposition:  Home vs SNF pending mobility/progress with therapy and medical stability.

## 2021-05-19 NOTE — PLAN OF CARE
Problem: Falls - Risk of:  Goal: Will remain free from falls  Description: Will remain free from falls  5/18/2021 2351 by Ryan Coulter RN  Outcome: Ongoing   Patient has remained free of falls. 2/4 bed rails up, bed locked and in lowest position, call light within reach. Patient instructed on use of call light and uses appropriately. Bed alarm on. Non-skid footwear and fall band on. Problem: Pain - Acute:  Goal: Pain level will decrease  Description: Pain level will decrease  5/18/2021 2351 by Ryan Coulter RN  Outcome: Ongoing     Numeric pain rating scale being used. Patient repositioned for comfort. Ice applied. Patient is tolerating PO pain medicine.

## 2021-05-19 NOTE — DISCHARGE INSTR - COC
Continuity of Care Form    Patient Name: Logan Murillo   :  1950  MRN:  [id number]    Admit date:  2021  Discharge date:  2021    Code Status Order: Full Code   Advance Directives:   [facility] Documentation     Date/Time Healthcare Directive Type of Healthcare Directive Copy in [address] Agent's Name Healthcare Agent's Phone Number    21 6133  Yes, patient has an advance directive for healthcare treatment --  No, copy requested from family -- -- --    21 1503  No, patient does not have an advance directive for healthcare treatment --  -- -- -- --          Admitting Physician:  Santos Ayala MD  PCP: Robson Patterson DO    Discharging Nurse: [facility] Unit/Room#: 6438/9136-51  Discharging Unit Phone Number: [phone]    Emergency Contact:   Extended Emergency Contact Information  Primary Emergency Contact: [facility]  Address: [address] Phone: [phone]  Mobile Phone: [phone]  Relation: Spouse  Secondary Emergency Contact: [address] Phone: [phone]  Relation: Child    Past Surgical History:  Past Surgical History:   Procedure Laterality Date    APPENDECTOMY      BREAST LUMPECTOMY      BREAST SURGERY Bilateral     biopsy    CARPAL TUNNEL RELEASE Bilateral     HYSTERECTOMY      JOINT REPLACEMENT Right     KNEE    KNEE ARTHROSCOPY Right     NICK AND BSO      TONSILLECTOMY      TOTAL KNEE ARTHROPLASTY Right 2021    RIGHT MINIMALLY INVASIVE TOTAL KNEE ARTHROPALSTY performed by Santos Ayala MD at [facility] Left 2021    MINIMALLY INVASIVE LEFT TOTAL KNEE ARTHROPLASTY performed by Santos Ayala MD at [facility] [address]       Immunization History:   Immunization History   Administered Date(s) Administered    COVID-19, Barreto Peter, PF, 30mcg/0.3mL 2021, 2021 Active Problems:  Patient Active Problem List   Diagnosis Code    Plantar fasciitis, left M72.2    Tenosynovitis of foot M65.9    Arthritis of right knee M17.11    Arthritis of left knee M17.12       Isolation/Infection:   Isolation          No Isolation        Patient Infection Status     Infection Onset Added Last Indicated Last Indicated By Review Planned Expiration Resolved Resolved By    None active    Resolved    MRSA 02/23/21 02/25/21 02/23/21 Culture, MRSA, Screening   05/18/21 Tivis Beams Post, RN    Jenny does not isolate for MRSA nasal colonization          Nurse Assessment:  Last Vital Signs: /71   Pulse 91   Temp 98.1 °F (36.7 °C) (Oral)   Resp 16   Ht 5' 6.5\" (1.689 m)   Wt 255 lb (115.7 kg)   SpO2 91%   BMI 40.54 kg/m²     Last documented pain score (0-10 scale): Pain Level: 3  Last Weight:   Wt Readings from Last 1 Encounters:   05/18/21 255 lb (115.7 kg)     Mental Status:  oriented and alert    IV Access:  - None    Nursing Mobility/ADLs:  Walking   Assisted  Transfer  Assisted  Bathing  Assisted  Dressing  Assisted  Toileting  Assisted  Feeding  Independent  Med Admin  Independent  Med Delivery   whole    Wound Care Documentation and Therapy:        Elimination:  Continence:   · Bowel: Yes  · Bladder: Yes  Urinary Catheter: None   Colostomy/Ileostomy/Ileal Conduit: No       Date of Last BM: prior    Intake/Output Summary (Last 24 hours) at 5/19/2021 1027  Last data filed at 5/19/2021 0907  Gross per 24 hour   Intake 1876 ml   Output 1850 ml   Net 26 ml     I/O last 3 completed shifts: In: 2096 [P.O.:900; I.V.:1196]  Out: 1850 [Urine:1850]    Safety Concerns:      At Risk for Falls    Impairments/Disabilities:      None    Nutrition Therapy:  Current Nutrition Therapy:   - Oral Diet:  General    Routes of Feeding: Oral  Liquids: No Restrictions  Daily Fluid Restriction: no  Last Modified Barium Swallow with Video (Video Swallowing Test): not done    Treatments at the Time of Hospital Discharge:   Respiratory Treatments: none  Oxygen Therapy:  is not on home oxygen therapy. Ventilator:    - No ventilator support    Rehab Therapies: Physical Therapy and Occupational Therapy  Weight Bearing Status/Restrictions: No weight bearing restirctions  Other Medical Equipment (for information only, NOT a DME order):  walker  Other Treatments: none    Patient's personal belongings (please select all that are sent with patient):  Deandra    RN SIGNATURE:  Electronically signed by Eladio Lara RN on 21 at 10:30 AM EDT    CASE MANAGEMENT/SOCIAL WORK SECTION    Inpatient Status Date: ***    Readmission Risk Assessment Score:  Readmission Risk              Risk of Unplanned Readmission:  0           Discharging to Facility/ Agency   · Name:   · Address:  · Phone:  · Fax:    Dialysis Facility (if applicable)   · Name:  · Address:  · Dialysis Schedule:  · Phone:  · Fax:    / signature: {Esignature:[id number]}    PHYSICIAN SECTION    Prognosis: {Prognosis:6031560942}    Condition at Discharge: [address] Blanca Iker Patient Condition:[id number]}    Rehab Potential (if transferring to Rehab): {Prognosis:9128627568}    Recommended Labs or Other Treatments After Discharge: ***    Physician Certification: I certify the above information and transfer of Anise Fothergill  is necessary for the continuing treatment of the diagnosis listed and that she requires {Admit to Appropriate Level of Care:96231} for {GREATER/LESS:545079616} 30 days.      Update Admission H&P: {CHP DME Changes in IXKQ}    PHYSICIAN SIGNATURE:  {Esignature:[id number]}

## 2021-05-20 ENCOUNTER — CARE COORDINATION (OUTPATIENT)
Dept: CASE MANAGEMENT | Age: 71
End: 2021-05-20

## 2021-05-20 NOTE — CARE COORDINATION
Patient contacted regarding COVID-19 risk. Care Transition Nurse contacted the patient by telephone to perform post discharge assessment. Call within 2 business days of discharge: Yes. Verified name and  with patient as identifiers. Provided introduction to self, and explanation of the CTN/ACM role, and reason for call due to risk factors for infection and/or exposure to COVID-19. Symptoms reviewed with patient who verbalized the following symptoms: pain or aching joints. Due to no new or worsening symptoms encounter was not routed to provider for escalation. Discussed follow-up appointments. If no appointment was previously scheduled, appointment scheduling offered: Yes  [address] follow up appointment(s): No future appointments. Non-face-to-face services provided:  Obtained and reviewed discharge summary and/or continuity of care documents  Education of patient/family/caregiver/guardian to support self-management-. Assessment and support for treatment adherence and medication management-. Advance Care Planning:   Does patient have an Advance Directive:  not on file. Educated patient about risk for severe COVID-19 due to risk factors according to CDC guidelines. CTN reviewed discharge instructions, medical action plan and red flag symptoms patient who verbalized understanding. Discussed COVID vaccination status No. Education provided on COVID-19 vaccination as appropriate. Discussed exposure protocols and quarantine with CDC Guidelines. Patient was given an opportunity to verbalize any questions and concerns and agrees to contact CTN or health care provider for questions related to their healthcare. Reviewed and educated patient on any new and changed medications related to discharge diagnosis     CTN provided contact information. Plan for follow up call in 7-14 days based on severity of symptoms and risk factors.     Thank Becky Ramirez RN  Care Transition Coordinator  Contact

## 2021-05-28 NOTE — DISCHARGE SUMMARY
[id number] [facility] SUMMARY    Pre Operative Diagnosis  Osteoarthritis of left knee, unspecified osteoarthritis type [M17.12]    Post Operative Diagnosis  Osteoarthritis of left knee, unspecified osteoarthritis type [M17.12]    Discharge Diagnosis Osteoarthritis of left knee, unspecified osteoarthritis type [M17.12]    Procedure Preformed  Procedure(s): MINIMALLY INVASIVE LEFT TOTAL KNEE ARTHROPLASTY    Surgeon  David Rowland MD     Medical course: as per medical records       The patient was taken to the operating room  where the aforementioned procedure was preformed. The patient was taken to the post operative anesthesia recovery unit in stable condition. The patient was then transferred to the orthopaedic floor for post operative pain management and convalesce. ( x )The patient was placed on anticoagulation therapy for DVT prophylaxis       The patient was discharged in stable condition. Please see medical reconciliation for discharge medications. The discharge instructions were explained to the patient and the family. The patient will follow up in the office in 3 weeks for repeat examination and xray .

## 2021-06-03 ENCOUNTER — CARE COORDINATION (OUTPATIENT)
Dept: CASE MANAGEMENT | Age: 71
End: 2021-06-03

## (undated) DEVICE — SUTURE VCRL SZ 2-0 L18IN ABSRB UD CT-1 L36MM 1/2 CIR J839D

## (undated) DEVICE — SOLUTION IV 100ML 0.9% SOD CHL PLAS CONT USP VIAFLX 1 PER

## (undated) DEVICE — 3M™ STERI-DRAPE™ U-DRAPE 1015: Brand: STERI-DRAPE™

## (undated) DEVICE — SUTURE MCRYL SZ 4-0 L27IN ABSRB UD L19MM PS-2 1/2 CIR PRIM Y426H

## (undated) DEVICE — GLOVE 6 LTX ST BIOGEL M PF BEAD CUFF

## (undated) DEVICE — DRESSING W4XL8IN ISLANDTHERABOND 3D

## (undated) DEVICE — SST BUR, WIRE PASS DRILL, 2 FLUTES, MED., 2MM DIA.: Brand: MICROAIRE®

## (undated) DEVICE — 3M™ COBAN™ NL STERILE NON-LATEX SELF-ADHERENT WRAP, 2086S, 6 IN X 5 YD (15 CM X 4,5 M), 12 ROLLS/CASE: Brand: 3M™ COBAN™

## (undated) DEVICE — Z INACTIVE NO SUPPLIER SOLUTIONIRRIG 3000ML 0.9% SOD CHL FLX CONT [79720808] [HOSPIRA WORLDWIDE INC]

## (undated) DEVICE — MARKER SURG SKIN UTIL BLK REG TIP NONSMEARING W/ 6IN RUL

## (undated) DEVICE — DUAL CUT SAGITTAL BLADE

## (undated) DEVICE — SPONGE,LAP,18"X18",DLX,XR,ST,5/PK,40/PK: Brand: MEDLINE

## (undated) DEVICE — 3M™ IOBAN™ 2 ANTIMICROBIAL INCISE DRAPE 6640EZ: Brand: IOBAN™ 2

## (undated) DEVICE — SUTURE STRATAFIX SPRL SZ 1 L14IN ABSRB VLT L48CM CTX 1/2 SXPD2B405

## (undated) DEVICE — PADDING CAST N ADH 12X6 IN CRIMPED FINISH 100% COTTON WBRLII

## (undated) DEVICE — SOLUTION IV IRRIG WATER 1000ML POUR BRL 2F7114

## (undated) DEVICE — BLANKET WRM W29.9XL79.1IN UP BODY FORC AIR MISTRAL-AIR

## (undated) DEVICE — ZIMMER® STERILE DISPOSABLE TOURNIQUET CUFF WITH PLC, DUAL PORT, SINGLE BLADDER, 30 IN. (76 CM)

## (undated) DEVICE — BIPOLAR SEALER 23-112-1 AQM 6.0: Brand: AQUAMANTYS ®

## (undated) DEVICE — 3M™ STERI-DRAPE™ INSTRUMENT POUCH 1018: Brand: STERI-DRAPE™

## (undated) DEVICE — UNDERGLOVE SURG SZ 8.5 FNGR THK0.21MIL GRN LTX BEAD CUF

## (undated) DEVICE — Device

## (undated) DEVICE — PACK PROCEDURE SURG TOTAL KNEE

## (undated) DEVICE — SUTURE VCRL SZ 1 L18IN ABSRB UD L36MM CT-1 1/2 CIR J841D

## (undated) DEVICE — STERILE POLYISOPRENE POWDER-FREE SURGICAL GLOVES WITH EMOLLIENT COATING: Brand: PROTEXIS

## (undated) DEVICE — 2108 SERIES SAGITTAL BLADE FAN, OFFSET  (29.0 X 0.89 X 73.0MM)

## (undated) DEVICE — APPLICATOR PREP 26ML 0.7% IOD POVACRYLEX 74% ISO ALC ST

## (undated) DEVICE — COVER LT HNDL BLU PLAS

## (undated) DEVICE — KNEE HOLDER DISPOSABLE LINER: Brand: ALVARADO®  KNEE SUPPORT

## (undated) DEVICE — SOLUTION IV 1000ML 0.9% SOD CHL

## (undated) DEVICE — GOWN,SIRUS,POLYRNF,BRTHSLV,XL,30/CS: Brand: MEDLINE

## (undated) DEVICE — COUNTER NDL 40 COUNT HLD 70 NUM FOAM BLK SGL MAG W BLDE REMV

## (undated) DEVICE — BOWL AND CEMENT CARTRIDGE WITH BREAKAWAY FEMORAL NOZZLE AND MEDIUM PRESSURIZER: Brand: ACM

## (undated) DEVICE — SYSTEM SKIN CLSR 22CM DERMBND PRINEO

## (undated) DEVICE — NEEDLE SPNL L3.5IN PNK HUB S STL REG WALL FIT STYL W/ QNCKE

## (undated) DEVICE — GARMENT,MEDLINE,DVT,INT,CALF,MED, GEN2: Brand: MEDLINE

## (undated) DEVICE — PLATE ES AD W 9FT CRD 2

## (undated) DEVICE — HANDPIECE SUCTION TUBING INTERPULSE 10FT

## (undated) DEVICE — BIT DRILL DISTL INTRAMEDULLARY 3.2MM SS

## (undated) DEVICE — STERILE SYNTHETIC POLYISOPRENE POWDER-FREE SURGICAL GLOVES WITH HYDROGEL COATING, SMOOTH FINISH, STRAIGHT FINGER: Brand: PROTEXIS

## (undated) DEVICE — E-Z CLEAN, NON-STICK, PTFE COATED, ELECTROSURGICAL BLADE ELECTRODE, 2.5 INCH (6.35 CM): Brand: EZ CLEAN

## (undated) DEVICE — BLADE SAW RECIP HVY DUTY LNG 27796327] STRYKER CORP]

## (undated) DEVICE — SUTURE VCRL SZ 0 L18IN ABSRB UD L36MM CT-1 1/2 CIR J840D

## (undated) DEVICE — YANKAUER,OPEN TIP,W/O VENT,STERILE: Brand: MEDLINE INDUSTRIES, INC.

## (undated) DEVICE — SYRINGE IRRIG 60ML SFT PLIABLE BLB EZ TO GRP 1 HND USE W/

## (undated) DEVICE — DECANTER BAG 9": Brand: MEDLINE INDUSTRIES, INC.

## (undated) DEVICE — SURE SET-DOUBLE BASIN-LF: Brand: MEDLINE INDUSTRIES, INC.

## (undated) DEVICE — STERILE PVP: Brand: MEDLINE INDUSTRIES, INC.

## (undated) DEVICE — SCREW BNE L48MM CONSTRN CNDYL KNEE HEX HD STEM FOR LEG NXGN
Type: IMPLANTABLE DEVICE | Site: KNEE | Status: NON-FUNCTIONAL
Removed: 2021-05-18

## (undated) DEVICE — ORTHO PRE OP PACK: Brand: MEDLINE INDUSTRIES, INC.

## (undated) DEVICE — JEWISH HOSPITAL TURNOVER KIT: Brand: MEDLINE INDUSTRIES, INC.

## (undated) DEVICE — HIGH FLOW TIP

## (undated) DEVICE — PEEL-AWAY HOOD: Brand: FLYTE, SURGICOOL